# Patient Record
(demographics unavailable — no encounter records)

---

## 2019-12-14 NOTE — HP
This is Bird Boyce PA-C dictating a report for Finn Damico MD.

This is a 50-minute initial patient evaluation of which greater than 50% of the exam

was spent in counseling and coordinating the patient's care.  Remainder of the exam

was spent in review of patient's medical records and formulation of treatment plan,

as well as review of appropriate imaging studies. 



CHIEF COMPLAINT:  Status post fall with left hand paresthesias and generalized arm

weakness. 



HISTORY OF PRESENT ILLNESS:  Mr. Acevedo is a pleasant 82-year-old male, who presents

as a transfer from [facility] for the above complaints.

Apparently, the patient sustained a fall in December 12th of which his legs gave

way.  He did strike the top of his head.  He has a history of hypertension, coronary

artery disease, and CABG roughly 15 years ago.  He is on 81 mg aspirin daily and has

received 40 mg subcu of Lovenox for DVT prophylaxis.  The patient had complaints of

posterior neck pain, although during the exam today denies this.  He has also had

entire arm worse on the right than the left paresthesias.  The patient however has

noticed significant balance difficulties and difficulty with ambulation.  He

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  MRI of the cervical spine shows

significant spondylosis virtually through C4-C7 with T2 hyperintensity roughly at

C4-C7.  The patient has been on some Rocephin for bronchitis and he did receive 10

mg of Decadron prior to his transfer from [facility].  I should

also note that he has most profound stenosis on the left at C5. 



PHYSICAL EXAMINATION:

The patient is awake, alert, and appropriate.  GCS is 15.  He follows commands

equally in all extremities.  He appears to have some right greater than left

weakness in the right arm and leg.  He has profound bilateral triceps weakness and

moderate-to-severe bilateral hand intrinsic weakness.  In fact, he is unable to even

spread his fingers apart signifying significant hand intrinsic weakness.  He has

intact sensation throughout all the extremities.  He apparently has been able to sit

at the edge of the bed, but has been unable to stand secondary to unsteadiness and

weakness on his legs.  He is wearing an Aspen collar, though prefers to keep this

rather loose as he states that it is uncomfortable. 



IMPRESSION DIAGNOSES:  

1. Status post fall with cervical spondylosis with significant spinal cord

compression. 

2. Right greater than left extremity weakness.

3. History of coronary artery bypass grafting on 81 mg aspirin.



PLAN:  At this time, I discussed the patient's case and imaging with Dr. Damico.  We

have transferred the patient from [facility] secondary to the

patient requiring higher level of care, neuromonitoring in the ICU, and plan for

surgical intervention tomorrow.  I have updated the patient's family at bedside, as

well as the patient.  Plan will be to proceed to the operating room tomorrow for

C4-C7 anterior cervical diskectomy and fusion and during the same procedure C4-C7

laminectomies with posterior fusion.  I have discussed risks and benefits of the

surgery, and the patient and his family wished to proceed with the surgery.  At this

time, however, we will ask that he remain on bedrest with a Perez in place and we

will continue Decadron 4 mg q.6 hours with Protonix.  He will be n.p.o. at midnight.

 The patient's family understand the gravity of the situation and that if we do not

further preserve the patient's spinal cord, he will be unable to move his arms and

legs, especially if he has another fall.  Again, they wished to proceed with

surgery.  Please call with any changes in patient's neurologic status.  Otherwise,

we will check back on the patient in the morning. 







Job ID:  [id number]

## 2019-12-15 NOTE — CON
DATE OF CONSULTATION:  12/15/2019



REQUESTING PHYSICIAN:  Bird Boyce PA-C



REASON FOR CONSULTATION:  Medical management.



HISTORY OF PRESENT ILLNESS:  This is an 82-year-old  male with 
past

medical history of coronary artery disease, status post remote CABG, on aspirin;

hypertension; dyslipidemia; who experienced a mechanical fall on Thursday with

resultant scalp laceration requiring stapling at [facility] and was

subsequently transferred to [facility] where an MRI cervical

spine revealed significant spondylosis from C4 through C7 with T2 
hyperintensity at

C4 through C7 and profound cervical spine stenosis with right greater than

left-sided weakness, who was subsequently transferred to [facility] for

neurosurgical intervention.  He is status post C4 through C7 anterior cervical

diskectomy with fusion and C4 through C7 laminectomies with posterior fusion on

12/14/2019 by surgeon Dr. Damico.  The patient was seen at bedside 
postoperatively

in ICU where he is currently extubated on room air with notable SARAH drain with

serosanguineous output.  He is accompanied by granddaughter.  He rates his 
current

pain as 0/10 in severity.  He continues to complain of weakness and swelling in 
the

right upper extremity.  He offers no other acute complaints.  Nursing staff 
notes no

acute events except for intermittent hypertension.  The patient denies any 
general

complaints, respiratory complaints, and has been tolerant of oral intake 
without any

difficulty. 



PAST MEDICAL HISTORY:  Coronary artery disease, status post remote CABG;

hypertension; dyslipidemia. 



PAST SURGICAL HISTORY:  Remote CABG.



SOCIAL HISTORY:  The patient lives at home.  He is ambulatory without assistive

devices.  He denies any current tobacco, alcohol use, but admits to remote 
tobacco

use over 2 decades ago. 



ALLERGIES:  NONE REPORTED.



REVIEW OF SYSTEMS:  Pertinent positives as noted per HPI.



MEDICATIONS:  Home medication will be reviewed as per admission medication

reconciliation. 



Current inpatient medications reviewed.



FAMILY HISTORY:  The patient denies any chronic medical comorbidities in family

members. 



PHYSICAL EXAMINATION:

VITAL SIGNS:  Pulse 75 to 79, sinus rhythm; blood pressure ranges from 162/75 to

170s over 70s to 80s; oxygen saturation 96% on room air; respirations 18. 

GENERAL APPEARANCE:  This is an elderly male, who is awake, alert, oriented,

coherent, lucid, not in obvious distress. 

HEENT:  Normocephalic.  There are staples noted overlying anterior skull.  No 
facial

asymmetry.  Pupils equally round.  Extraocular muscles intact. 

NECK:  There is a bandage overlying anterior neck with a SARAH drain with

serosanguineous output.  There is posterior neck bandage also noted with limited

range of motion. 

CARDIOVASCULAR:  S1 and S2.  Regular rate and rhythm.  No harsh murmur.  No

reproducible left anterior chest wall tenderness to palpation.  Median 
sternotomy

scar noted. 

LUNGS:  Bilateral equal air entry on anterior auscultation.  No labored

respirations.  No wheezing or rales. 

ABDOMEN:  Soft, nontender, nondistended. 

EXTREMITIES:  No appreciable lower extremity edema.  There is edema in the right

upper extremity noted.  No cyanosis or deformity. 

SKIN:  Warm to touch without rash, pallor, or abrasion. 

GENITOURINARY:  Evaluation and doing Perez catheter with yellow urine. 

NEUROLOGIC:  No facial asymmetry.  Symmetrical smile, symmetrical muscle with 
facial

expression.  Reported sensation symmetrical in bilateral upper extremities.  
There

is decreased handgrip on the right hand in comparison to the left hand.  The 
patient

is able to lift bilateral lower extremities to antigravity. 



LABORATORY DATA:  On 12/15/2019, reviewed.  WBC 6.8, H and H of 11.2/33.3, 
platelets

120.  Sodium 141, potassium 4, chloride 109, bicarb 24, glucose 133, BUN and

creatinine are 15/0.97, calcium 9.0. 



IMAGING:  From outside facility, MRI cervical spine reported to suggest severe 
cord

compression from C5 through C7 with moderate-to-severe at C4-C5, namely in the 
left

C5 neuroforamen with extensive edema in the spinal cord from C3 all the way 
down to

T1 with kyphotic deformity. 



ASSESSMENT:  

1. Hypertension, uncontrolled.  May be attributed to multiple factors in the

perioperative setting.  The patient has been restarted on his home dose of oral

lisinopril 20 mg daily.  Noted blood pressure parameters with p.r.n. IV

antihypertensive medications.  Monitor for optimal pain control, anxiety,

positioning. 

2. Dyslipidemia.  Continue Lipitor 20 mg nightly.

3. Coronary artery disease, status post remote coronary artery bypass graft.  
Hold

antiplatelet therapy until okay with neurosurgeon.  Continue remainder of 
coronary

artery disease medications with statin and ACE inhibitor. 

4. Acute cervical spinal cord compression.  The patient is status post anterior

cervical diskectomy with fusion as well as posterior cervical laminectomies by

neurosurgeon, Dr. Damico.  Continue postoperative care as per surgeon.  Monitor 
SARAH

drain output.  The patient is notably on IV Decadron, GI prophylaxis, and

perioperative antibiotics.  Monitor for pain control.  Continue rehabilitation 
as

per surgeon. 

5. Gastrointestinal prophylaxis with IV Protonix.

6. Deep venous thrombosis prophylaxis in bilateral lower extremity with SCDs. 



Thank you for the courtesy of this medical consultation.  We will follow this

patient along with you.  Plan of care discussed with the patient, the patient's

granddaughter, and ICU nurse. 







Job ID:  [id number]



MTDD

## 2019-12-15 NOTE — PRG
DATE OF SERVICE:  12/15/2019



I reviewed the notes of my colleague, Bird Boyce PA-C, and agree with its

content.  Mr. Acevedo is an 82-year-old very active gentleman.  He works as a

.  He fell three days ago and sustained a facial injury that required

staples to the forehead.  He then began to develop progressive quadriparesis, and

presented to [facility] yesterday afternoon.  MRI demonstrated

severe cord compression from C5 through C7 with a moderate-to-severe at C4-C5,

namely in the left C5 neuroforamen.  There was extensive edema in the spinal cord

from C3 all the way down to T1 and there was a kyphotic deformity that the patient

had.  This was circumferential stenosis with spondylitic change.  He was initiated

on Decadron and transferred to [facility] for higher level of care and

close neuro ICU monitoring.  Overnight as his MAPs ranged from 80 to 111, we

initiated Decadron.  He has noticed improvement.  His exam is as laid out by my

colleague, Ms. Boyce. 



I have let the patient and his family know that this needs to be surgically dealt

with.  We will plan for an anterior C4 through C7 decompression and fusion and also

posterior C4 through C7 laminectomy and stabilization and to reverse his deformity

and decompress the spinal cord.  I should note his labs are acceptable.  He does

have a recent history of bronchitis and has been treated with antibiotics.  He did

have a temperature of 100.5 classified as a fever.  However, this is an emergent

case that we cannot, but I do not feel comfortable delaying.  I should note that the

edema and the swelling in his cord indicates an acute injury.  I do not think an

underlying intramedullary neoplasm is present and a contrast-enhanced imaging would

be dubious as often times acute inflammation and even compression can enhance as

well.  Obviously, one option would be a followup MRI at three months, but this for

all intents and purposes is an acute compressive cord injury, not neoplastic in my

opinion.  Should note his CT demonstrates expected multilevel severe spondylitic

changes with deformity.  We have extensively discussed the surgery with he and his

family.  He is in a collar.  They understand and we will proceed with cervical

spinal cord injury with compression and quadriparesis. 







Job ID:  [id number]

## 2019-12-16 NOTE — PDOC.PALCO
Palliative Care Consult





- Consult Details


Requesting Physician: Dr Landry


Reason for Consult: advance directives assistance, family support


Family Members Present: Paitent wife and daughter 





- Pertinent HPI





82 year old male with increase in weakness, unsteady gait and falls as per 

daughter who is at bedside. Review of records, and history obtained in room 

indicated patient had a  fall Dec 12 and presented to the emergency room . 

Patient was transferred to [facility] for a higher level of care. Patient 

admitted for S/P cervical spondylosis with spinal cord compression, and 

bronchitis. 





- Pertinent PMH





HTN, CAD with CABG 15 years ago. 





- Social History


Smoking Status: Unknown if ever smoked


Alcohol Use: none


Drug Use History: none


Living Situation:  (Lives independently with his wife.)





- Medications


MAR Reviewed: Yes





- Allergies


Allergies/Adverse Reactions: 


 Allergies











Allergy/AdvReac Type Severity Reaction Status Date / Time


 


No Known Allergies Allergy   Verified 03/26/17 06:05














- Subjective





C-collar on, awake alert. 





- ROS


Constitutional: alert, weakness


Eyes: other (Denies visual changes)


ENT: other (negative for dysphagia, hearing loss)


Respiratory: productive cough, shortness of breath with extertion


Cardiology: other (negative for chest pain, palpitations. )


Gastrointestinal: other (negative for nausea, vomiting, constipation)


Musculoskeletal: limited mobility


Neurological: dizziness





- Objective


Vital Signs: 


 Vital Signs - Most Recent











Temp Pulse Resp BP Pulse Ox


 


 98.5 F   77   16   124/83   95 


 


 12/16/19 12:00  12/16/19 09:59  12/15/19 19:08  12/16/19 09:59  12/16/19 07:18











Palliative Performance Scale: 40





- Advance Directives


Medical Power of : Daughter states it is her, will follow up with 

paperwork





- Physical Exam


Constitutional: NAD


HEENT: EOMI, moist MMs, sclera anicteric


Respiratory: cough, wheezing present


Cardiovascular: RRR


Gastrointestinal: non-tender, no distention


Genitourinary: moss catheter


Musculoskeletal: edema present


Neurology: moves all 4 limbs


Skin: cap refill <2 seconds


Deviation from normal: facial brusing from fall, abrasion to scalp


Psychiatric: A&O x 3, normal affect





- Problem List


(1) Palliative care encounter


Code(s): Z51.5 - ENCOUNTER FOR PALLIATIVE CARE   Current Visit: Yes   Status: 

Acute   





(2) Cervical spinal cord compression


Code(s): G95.20 - UNSPECIFIED CORD COMPRESSION   Current Visit: Yes   Status: 

Acute   





(3) CHI (closed head injury)


Code(s): S09.90XA - UNSPECIFIED INJURY OF HEAD, INITIAL ENCOUNTER   Current 

Visit: No   Status: Acute   





- Plan/Recommendations


Plan:


Patient and family desire for patient to remain with full resuscitative 

measures in place. 


*Daughter states she is MPOA (Secondary to her mother/patients wife dementia) 

will follow up and have [facility] ensure paperwork is in order.


*Family plans on rehab at discharge to gain strength to return to the home 

setting. 


*Discussed with daughter to also consider goals of care for the future if falls/

weakness for her father continue and her mothers dementia progresses. 


*Denies any further needs from Palliative Care





Palliative Care will ensure MPOA complete and then sign off. If further 

assistance is needed please make us aware.




















[45] minutes spent on this encounter with >50% of the time in counseling and 

coordination of care.





Thank you for this very appropriate consult.

## 2019-12-16 NOTE — CON
DATE OF CONSULTATION:  



HISTORY OF PRESENT ILLNESS:  Chaz Acevedo is an 82-year-old gentleman.  His history

is outlined.  He was transferred from [facility] to higher level of care

over here, he underwent a cervical laminectomy by Dr. Damico.  Please review his

extensive note. His postop in the ICU, off the vent.  Procedures performed; anterior

C4-C7 decompression and fusion, C4-C7 laminectomy and stabilization.  He tells me

that he has no primary care doctor, but does see a local cardiologist. 



He was hospitalized at [facility] with frequent falls.  He has had a

previous subdural in the past. 



PAST MEDICAL HISTORY:  

1. Coronary artery disease.

2. Hypertension.

3. Dyslipidemia.



PAST SURGICAL HISTORY:  Bypass.



SOCIAL HISTORY:  Tobacco 15 years ago, quit, a pack every other day.  __________

pneumonia, TB, or asthma. 



HOME MEDICINES:  

1. Lisinopril 20.

2. Advair 100/50, presumed asthma.

3. Eye drops.



REVIEW OF SYSTEMS:  Otherwise, unremarkable postop.



PHYSICAL EXAMINATION:

VITAL SIGNS:  Pulse is 68, blood pressure is 117/80, respiratory rate is 18, sats

are 100%. 

GENERAL:  Awake, alert, responsive. 

EXTREMITIES:  Moves all 4 extremities. 

CHEST: No wheezing or crackles. 

CARDIAC: Normal S1, S2.  No gallops. 

ABDOMEN:  No masses.



LABORATORY DATA:  Unremarkable. 



Urine shows E coli from 12/12, sensitive to all the antibiotics.



ASSESSMENT:  

1. Presumed urinary tract infection.

2. History of asthma.

3. Cervical laminectomy.

4. Hypertension.

5. Coronary artery disease.



PLAN:  We will follow in the ICU.  I will restart his home medication. 



Consultation note, 70 minutes, 50% direct patient care.







Job ID:  [id number]

## 2019-12-16 NOTE — PRG
DATE OF SERVICE:  12/16/2019



This is Bird Boyce PA-C dictating a report for Finn Damico MD.



Postoperative recheck.  Mr. Acevedo is postoperative day #1 having undergone anterior

and posterior cervical fusions for spinal cord injury and spinal cord compression.

The patient is doing very well today.  He has had improvement in his right upper

extremity strength and that especially into the triceps, but not yet into the 

strength.  He is up sitting in a chair.  He has an Aspen collar on.  His drain

output overnight was 45 mL and we will leave this in today.  We will continue Ancef.

 His ultrasound was negative for bilateral lower extremity DVT.  He is safe for

transfer to the surgical floor __________ therapies.  Again, he should be in his

[place] collar at all times and I have ordered a [place] collar for showers.  We

will begin an 8-day Decadron taper starting tomorrow.  We will hold his aspirin for

5 days postoperatively.  Please call with any changes in the patient's neurologic

status.  Otherwise, we will continue to follow him. 







Job ID:  [id number]

## 2019-12-16 NOTE — PRG
DATE OF SERVICE:  12/16/2019



SUBJECTIVE:  Chaz Acevedo is status post spinal cord surgery, laminectomy.  Postop,

doing well, less pain, and less shortness of breath. 



OBJECTIVE:  VITAL SIGNS:  Saturations are 98% on room air, temperature 98, blood

pressure 127/64, and respiratory rate 18. 

CHEST:  Minimal wheezing. 

CARDIAC:  Normal S1 and S2.  No gallops. 

ABDOMEN:  Soft.



ASSESSMENT:  Chronic obstructive pulmonary disease, asthma, stable; status post

decompression surgery. 



PLAN:  Continue present treatment now, Dulera neb treatment, supportive care.  We

will follow in the ICU. 







Job ID:  [id number]

## 2019-12-16 NOTE — ULT
BILATERAL LOWER EXTREMITY VENOUS DOPPLER WITH SPECTRAL ANALYSIS AND COLOR FLOW EVALUATION:

 

HISTORY: 

Immobilization.

 

FINDINGS: 

Gray scale, color flow, Doppler evaluation, and spectral analysis and bilateral lower extremity venou
s structures is performed with 2D imaging.  A bilateral lower extremity common femoral, superficial f
emoral, popliteal, posterior tibial, and most proximal greater saphenous veins and profunda femoral v
eins are imaged bilaterally.

 

There is normal lumen compressibility, flow, and augmentation in the visualized deep venous structure
s of bilateral lower extremities.

 

IMPRESSION: 

No evidence of a deep vein thrombosis involving the visualized deep venous structures bilateral lower
 extremities.

 

POS: PASCUAL

## 2019-12-16 NOTE — PDOC.HOSPP
- Subjective


Encounter Date: 12/16/19


Encounter Time: 15:30


Subjective: 


Patient feeling better. Eating well. No pain currently. No other complaints. 

Eager to get to rehab as soon as cleared by neurosurgery.





- Objective


Vital Signs & Weight: 


 Vital Signs (12 hours)











  Temp Pulse BP Pulse Ox


 


 12/16/19 08:36    116/63 


 


 12/16/19 07:18     95


 


 12/16/19 07:00  98.7 F   


 


 12/16/19 04:00  98.5 F   


 


 12/16/19 00:00  99.8 F H   


 


 12/15/19 23:24    177/77 H 


 


 12/15/19 22:44   68  182/81 H 








 Weight











Weight                         193 lb 12.581 oz











 Most Recent Monitor Data











Heart Rate from ECG            78


 


NIBP                           130/70


 


NIBP BP-Mean                   90


 


Respiration from ECG           16


 


SpO2                           95














I&O: 


 











 12/15/19 12/16/19 12/17/19





 06:59 06:59 06:59


 


Intake Total 639 2113 240


 


Output Total 1800 1880 190


 


Balance -1161 233 50











Result Diagrams: 


 12/16/19 06:00





 12/16/19 06:00





Hospitalist ROS





- Review of Systems


Constitutional: denies: fever, chills


Respiratory: denies: cough, shortness of breath


Cardiovascular: denies: chest pain


Gastrointestinal: denies: nausea, vomiting, abdominal pain





- Medication


Medications: 


Active Medications











Generic Name Dose Route Start Last Admin





  Trade Name Freq  PRN Reason Stop Dose Admin


 


Atorvastatin Calcium  20 mg  12/15/19 21:00  12/15/19 22:12





  Lipitor  PO   20 mg





  HS ZUHAIR   Administration





     





     





     





     


 


Calcium/Vitamin D  250 mg  12/16/19 09:00  12/16/19 08:46





  Oscal + Vit D  PO   250 mg





  DAILY ZUHAIR   Administration





     





     





     





     


 


Clonidine  0.1 mg  12/15/19 13:59  12/15/19 23:24





  Catapres  PO   0.1 mg





  Q4H PRN   Administration





  Hypertension   





     





     





     


 


Hydralazine HCl  10 mg  12/15/19 14:16  12/15/19 22:44





  Apresoline  SLOW IVP   10 mg





  Q15M PRN   Administration





  KEEP SBP < 170   





     





     





     


 


Dexamethasone 4 mg/ Sodium  50.4 mls @ 100 mls/hr  12/14/19 23:59  12/16/19 05:

55





  Chloride  IVPB   50.4 mls





  Q6HR ZUHAIR   Administration





     





     





     





     


 


Sodium Chloride  1,000 mls @ 75 mls/hr  12/14/19 23:00  12/16/19 05:51





  Normal Saline 0.9%  IV   1,000 mls





  .[id number] ZUHAIR   Administration





     





     





     





     


 


Cefazolin Sodium/Dextrose 2 gm  50 mls @ 100 mls/hr  12/15/19 14:00  12/16/19 05

:52





  / Device  IVPB   50 mls





  Q8HR ZUHAIR   Administration





     





     





     





     


 


Latanoprost  1 drop  12/15/19 21:00  12/15/19 22:12





  Xalatan 0.005% Ophth Soln  R EYE   1 drop





  QPM ZUHAIR   Administration





     





     





     





     


 


Lisinopril  20 mg  12/15/19 09:00  12/16/19 08:36





  Zestril  PO   20 mg





  DAILY ZUHAIR   Administration





     





     





     





     


 


Mometasone Furoate/Formoterol Fumar  2 puff  12/15/19 18:30  12/16/19 06:51





  Dulera 200 Mcg/5 Mcg Inhaler  INH   2 puff





  BID-RT ZUHAIR   Administration





     





     





     





     


 


Multivitamins  1 tab  12/16/19 09:00  12/16/19 08:36





  Theragran  PO   1 tab





  DAILY ZUHAIR   Administration





     





     





     





     


 


Pantoprazole Sodium  40 mg  12/16/19 09:00  12/16/19 08:36





  Protonix  PO   40 mg





  DAILY ZUHAIR   Administration





     





     





     





     














- Exam


General Appearance: NAD


ENT: normocephalic atraumatic, moist mucosa


Neck - other findings: Collar in place


Heart: RRR, no murmur


Respiratory: CTAB, no wheezes


Gastrointestinal: soft, non-tender, non-distended, normal bowel sounds


Extremities: no edema


Psychiatric: normal affect, normal behavior, A&O x 3





Hosp A/P


(1) HTN (hypertension)


Code(s): I10 - ESSENTIAL (PRIMARY) HYPERTENSION   Status: Chronic   


Qualifiers: 


   Hypertension type: essential hypertension   Qualified Code(s): I10 - 

Essential (primary) hypertension   


Plan: 


improved control








(2) CAD (coronary artery disease)


Code(s): I25.10 - ATHSCL HEART DISEASE OF NATIVE CORONARY ARTERY W/O ANG PCTRS 

  Status: Chronic   


Plan: 


hx CABG








(3) Dyslipidemia


Code(s): E78.5 - HYPERLIPIDEMIA, UNSPECIFIED   Status: Chronic   


Plan: 


on Lipitor








(4) Cervical spinal cord compression


Code(s): G95.20 - UNSPECIFIED CORD COMPRESSION   Status: Acute   


Plan: 


s/p laminectomy and diskectomy with fusion








- Plan


PT/OT





to rehab when ok with neurosurgery

## 2019-12-16 NOTE — OP
DATE OF PROCEDURE:  12/15/2019



ASSISTANT:  Bird Boyce PA-C 



Modifier 57 should be added to this surgery as the decision to operate was made on

the day I saw the patient. 



PREPROCEDURE DIAGNOSES:  

1. Multilevel cervical stenosis with spinal cord compression.

2. 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



POSTPROCEDURE DIAGNOSES:  

1. Multilevel cervical stenosis with spinal cord compression.

2. 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



PROCEDURE PERFORMED:  

1. Anterior C4-C5, C5-C6, and C6-C7 diskectomies for decompression of spinal cord

and nerve roots. 

2. Placement of interbody spacer, C4-C5, C5-C6, and C6-C7, packed with local bone

autograft, obtained from same incision of allograft for arthrodesis. 

3. Anterior cervical plate and screw fixation, C4, C5, C6, and C7.

4. Use of operative microscope for microdissection.

5. C4, C5, C6, C7 laminectomies, partial facetectomies, and foraminotomies.

6. Posterior screw and corina fixation, C4, C5, C6, C7.

7. Posterolateral fusion, C4-C5, C5-C6, and C6-C7 with arthrodesis.



DESCRIPTION OF PROCEDURE:  After informed consent was obtained from the patient, the

patient was brought to the OR.  Proper patient, pause, and identification were

carried out.  He was placed under excellent endotracheal anesthesia and positioned

supine on the OR table.  All appropriate points were padded.  Cervical spine was

identified.  A right anterior oblique vic was drawn off.  This region was sterilely

cleansed, prepared, and draped.  Proper patient, pause, and identification was

carried out.  The wound was then opened with a combination of sharp, monopolar, and

blunt dissection, proceeded medial to the right carotid sheath and lateral to the

larynx, pharynx, and tracheoesophageal bundle.  I identified the prevertebral layer

of deep cervical fascia.  There was evidence of injury to the anterior longitudinal

ligament and prevertebral space from his head injury and hyperextension injury.

Localization film confirmed our area of interest following dissection and exposure

of the C4, C5, C6, C7 segments and disk spaces.  Distraction at C4-C5 occurred with

diskectomy at C4-C5 in preparation of the endplates.  We had excellent decompression

of common dural tube and nerve roots.  This was repeated following placement of

spacer at C4-C5, C5-C6, and C6-C7 with diskectomies at those segments as well,

preparation of the endplates and decompression of the neural elements.  Microscope

was used for microdissection.  Microscope was then removed and anterior cervical

plate and screw fixation from C4 through C7 then occurred.  Copious irrigation

occurred throughout as did maximizing hemostasis.  The wound was then closed in

anatomic layers over a drain.  The patient was then placed in Radha head mcneill and

positioned prone.  We then identified an approach that would allow us to reach the

C4, C5, C6, C7 dorsal spines and segments.  We then opened this area with

subperiosteal with sharp, monopolar, and blunt dissection and exposed the C4 and C7

segments.  We then performed following localization at C4, C5, C6, C7 laminectomies,

partial facetectomies, and foraminotomies, had excellent decompression in common

dural tube and nerve roots  __________ at that point, screws were placed in C4, C5,

C6, C7 in the lateral masses.  Rods were placed and final tightening.  Copious

irrigation occurred throughout as did maximizing hemostasis.  The deep posterior

lateral regions were then decorticated.  Local bone autograft obtained from same

incision.  Allograft was then placed in the posterolateral regions from C4 through

C7.  The wound was copiously irrigated.  Hemostasis was maximized.  The wound was

closed in anatomic layers.  The patient was then emerged from anesthesia. 







Job ID:  [id number]

## 2019-12-17 NOTE — PDOC.HOSPP
- Subjective


Encounter Date: 12/17/19


Encounter Time: 11:45


Subjective: 


Patient with some pain in neck with movement, no other complaints. Still not 

much right hand  strength. States BP always runs 110-130 at home.





- Objective


Vital Signs & Weight: 


 Vital Signs (12 hours)











  Temp Pulse Resp BP Pulse Ox


 


 12/17/19 08:17  98.1 F  79  16  166/83 H  92 L


 


 12/17/19 04:00  98.1 F  82  16  153/69 H  95


 


 12/17/19 00:00  98.3 F  93  16  157/73 H  97








 Weight











Weight                         193 lb 12.581 oz











 Most Recent Monitor Data











Heart Rate from ECG            81


 


NIBP                           159/81


 


NIBP BP-Mean                   107


 


Respiration from ECG           17


 


SpO2                           95














I&O: 


 











 12/16/19 12/17/19 12/18/19





 06:59 06:59 06:59


 


Intake Total 2113 600 


 


Output Total 1880 390 


 


Balance 233 210 











Result Diagrams: 


 12/16/19 06:00





 12/16/19 06:00





Hospitalist ROS





- Review of Systems


Respiratory: denies: cough, dry, shortness of breath


Cardiovascular: denies: chest pain, palpitations


Gastrointestinal: denies: nausea, vomiting, abdominal pain


Musculoskeletal: reports: neck pain


Neurological: reports: weakness





- Medication


Medications: 


Active Medications











Generic Name Dose Route Start Last Admin





  Trade Name Freq  PRN Reason Stop Dose Admin


 


Atorvastatin Calcium  20 mg  12/15/19 21:00  12/16/19 21:54





  Lipitor  PO   20 mg





  HS ZUHAIR   Administration





     





     





     





     


 


Calcium/Vitamin D  250 mg  12/16/19 09:00  12/16/19 08:46





  Oscal + Vit D  PO   250 mg





  DAILY ZUHAIR   Administration





     





     





     





     


 


Clonidine  0.1 mg  12/15/19 13:59  12/15/19 23:24





  Catapres  PO   0.1 mg





  Q4H PRN   Administration





  Hypertension   





     





     





     


 


Dexamethasone  4 mg  12/17/19 06:00  12/17/19 05:22





  Decadron  PO  12/19/19 00:00  4 mg





  Q6HR ZUHAIR   Administration





     





     





     





     


 


Hydralazine HCl  10 mg  12/15/19 14:16  12/15/19 22:44





  Apresoline  SLOW IVP   10 mg





  Q15M PRN   Administration





  KEEP SBP < 170   





     





     





     


 


Dexamethasone 4 mg/ Sodium  50.4 mls @ 100 mls/hr  12/14/19 23:59  12/17/19 05:

21





  Chloride  IVPB   Not Given





  Q6HR ZUHAIR   





     





     





     





     


 


Sodium Chloride  1,000 mls @ 75 mls/hr  12/14/19 23:00  12/17/19 05:29





  Normal Saline 0.9%  IV   Not Given





  .[id number] ZUHAIR   





     





     





     





     


 


Cefazolin Sodium/Dextrose 2 gm  50 mls @ 100 mls/hr  12/15/19 14:00  12/17/19 05

:22





  / Device  IVPB   50 mls





  Q8HR ZUHAIR   Administration





     





     





     





     


 


Latanoprost  1 drop  12/15/19 21:00  12/16/19 21:54





  Xalatan 0.005% Ophth Soln  R EYE   1 drop





  QPM ZUHAIR   Administration





     





     





     





     


 


Lisinopril  20 mg  12/15/19 09:00  12/16/19 08:36





  Zestril  PO   20 mg





  DAILY ZUHAIR   Administration





     





     





     





     


 


Mometasone Furoate/Formoterol Fumar  2 puff  12/15/19 18:30  12/17/19 07:35





  Dulera 200 Mcg/5 Mcg Inhaler  INH   2 puff





  BID-RT ZUHAIR   Administration





     





     





     





     


 


Multivitamins  1 tab  12/16/19 09:00  12/16/19 08:36





  Theragran  PO   1 tab





  DAILY ZUHAIR   Administration





     





     





     





     


 


Pantoprazole Sodium  40 mg  12/16/19 09:00  12/16/19 08:36





  Protonix  PO   40 mg





  DAILY ZUHAIR   Administration





     





     





     





     














- Exam


General Appearance: NAD


Eye: anicteric sclera


Heart: RRR, no murmur, no gallops, no rubs


Respiratory: CTAB, no wheezes, no rales


Gastrointestinal: soft, non-tender, non-distended, normal bowel sounds, no 

palpable masses


Neurological - other findings: weak right hand 


Psychiatric: normal affect, normal behavior





Hosp A/P


(1) HTN (hypertension)


Code(s): I10 - ESSENTIAL (PRIMARY) HYPERTENSION   Status: Chronic   


Qualifiers: 


   Hypertension type: essential hypertension   Qualified Code(s): I10 - 

Essential (primary) hypertension   


Plan: 


persistently elevated, will add amlodipine to lisinopril and monitor








(2) CAD (coronary artery disease)


Code(s): I25.10 - ATHSCL HEART DISEASE OF NATIVE CORONARY ARTERY W/O ANG PCTRS 

  Status: Chronic   





(3) Dyslipidemia


Code(s): E78.5 - HYPERLIPIDEMIA, UNSPECIFIED   Status: Chronic   





(4) Cervical spinal cord compression


Code(s): G95.20 - UNSPECIFIED CORD COMPRESSION   Status: Acute   





- Plan


PT/OT


to rehab when ok with neurosurgery

## 2019-12-17 NOTE — PRG
DATE OF SERVICE:  12/17/2019



Mr. Acevedo is postoperative day #2 from anterior-posterior decompression and fusion

of the spinal cord.  He appears to be in an excellent mood, doing very well.  He

states no pain medication.  He is a bit restless.  His exam has certainly improved.

He does have bilateral C7 myotomal weakness, not surprising also with hand intrinsic

weakness associated with finger extensor weakness, but overall frankly I am very

pleased with how he is doing.  We are working on inpatient rehab and we have kept

his drain in place at this point.  He is tolerating soft diet.  His drain output has

been 15 mL over the last 24 hours.  We will likely remove this tomorrow. 







Job ID:  [id number]

## 2019-12-17 NOTE — PRG
DATE OF SERVICE:  12/17/2019



This is Bird Boyce PA-C dictating a report for Finn Damico MD.



Mr. Acevedo is postoperative day #2 having undergone anterior and posterior cervical

fusions.  The patient is doing remarkably well.  He denies pain into the neck or

into any of the extremities.  He has found that he has also had improved strength on

the right including the right triceps and right hand , although he does continue

to have moderate-to-severe weakness into the right triceps and profound weakness

into the right hand .  Again, this is improved compared to his preoperative

exam.  We have started him on an 8-day Decadron taper and Protonix.  He may be

without his collar when he is in bed, but anytime he is out of bed, here he needs to

be wearing this.  We would really like to mobilize him more to prevent any type of

atelectasis, postoperative fever, or bilateral lower extremity DVT.  I should also

note that he did have an ultrasound of the bilateral lower extremities yesterday and

this was negative for DVT.  We will continue to leave the patient's SARAH drain in,

though output has been about 15 over the past 12 hours.  He also remains on Ancef

for this.  Main thing now is inpatient rehab and recovery, and we will ask the

Therapies work toward this angle.  Please call with any changes in the patient's

neurologic status.  Otherwise, the patient does appear to be improving

neurologically. 







Job ID:  [id number]

## 2019-12-18 NOTE — PDOC.HOSPP
- Subjective


Encounter Date: 12/18/19


Encounter Time: 11:30


Subjective: 


Patient with drain out this AM. No complaints.





- Objective


Vital Signs & Weight: 


 Vital Signs (12 hours)











  Temp Pulse Resp BP BP BP Pulse Ox


 


 12/18/19 09:28   64   130/60   


 


 12/18/19 08:20        95


 


 12/18/19 07:40  98.1 F  64  18   130/60   95


 


 12/18/19 04:00  98.9 F  71  16    144/62 H  93 L


 


 12/18/19 00:00  98.5 F  69  16    138/65  96








 Weight











Weight                         193 lb 12.581 oz











 Most Recent Monitor Data











Heart Rate from ECG            81


 


NIBP                           159/81


 


NIBP BP-Mean                   107


 


Respiration from ECG           17


 


SpO2                           95














I&O: 


 











 12/17/19 12/18/19 12/19/19





 06:59 06:59 06:59


 


Intake Total 600 3057 240


 


Output Total 390 2905 625


 


Balance 210 152 -385











Result Diagrams: 


 12/16/19 06:00





 12/16/19 06:00





Hospitalist ROS





- Review of Systems


Respiratory: denies: cough, dry, shortness of breath


Cardiovascular: denies: chest pain, palpitations


Gastrointestinal: denies: nausea, vomiting, abdominal pain


Neurological: reports: weakness





- Medication


Medications: 


Active Medications











Generic Name Dose Route Start Last Admin





  Trade Name Freq  PRN Reason Stop Dose Admin


 


Hydrocodone Bitart/Acetaminophen  1 tab  12/14/19 23:09  12/17/19 09:40





  Norco 7.5/325  PO   1 tab





  Q4H PRN   Administration





  Moderate Pain (4-6)   





     





     





     


 


Amlodipine Besylate  5 mg  12/17/19 09:00  12/18/19 09:28





  Norvasc  PO   5 mg





  DAILY ZUHAIR   Administration





     





     





     





     


 


Atorvastatin Calcium  20 mg  12/15/19 21:00  12/17/19 20:47





  Lipitor  PO   20 mg





  HS ZUHAIR   Administration





     





     





     





     


 


Calcium/Vitamin D  250 mg  12/16/19 09:00  12/18/19 09:28





  Oscal + Vit D  PO   250 mg





  DAILY ZUHAIR   Administration





     





     





     





     


 


Clonidine  0.1 mg  12/15/19 13:59  12/15/19 23:24





  Catapres  PO   0.1 mg





  Q4H PRN   Administration





  Hypertension   





     





     





     


 


Dexamethasone  4 mg  12/17/19 06:00  12/18/19 05:45





  Decadron  PO  12/19/19 00:00  4 mg





  Q6HR ZUHAIR   Administration





     





     





     





     


 


Hydralazine HCl  10 mg  12/15/19 14:16  12/15/19 22:44





  Apresoline  SLOW IVP   10 mg





  Q15M PRN   Administration





  KEEP SBP < 170   





     





     





     


 


Sodium Chloride  1,000 mls @ 75 mls/hr  12/14/19 23:00  12/18/19 05:46





  Normal Saline 0.9%  IV   1,000 mls





  .[id number] ZUHAIR   Administration





     





     





     





     


 


Cefazolin Sodium/Dextrose 2 gm  50 mls @ 100 mls/hr  12/15/19 14:00  12/18/19 05

:46





  / Device  IVPB   50 mls





  Q8HR ZUHAIR   Administration





     





     





     





     


 


Latanoprost  1 drop  12/15/19 21:00  12/17/19 20:48





  Xalatan 0.005% Ophth Soln  R EYE   1 drop





  QPM ZUHAIR   Administration





     





     





     





     


 


Lisinopril  20 mg  12/15/19 09:00  12/18/19 09:28





  Zestril  PO   20 mg





  DAILY ZUHAIR   Administration





     





     





     





     


 


Mometasone Furoate/Formoterol Fumar  2 puff  12/15/19 18:30  12/18/19 07:12





  Dulera 200 Mcg/5 Mcg Inhaler  INH   2 puff





  BID-RT ZUHAIR   Administration





     





     





     





     


 


Multivitamins  1 tab  12/16/19 09:00  12/18/19 09:28





  Theragran  PO   1 tab





  DAILY ZUHAIR   Administration





     





     





     





     


 


Pantoprazole Sodium  40 mg  12/16/19 09:00  12/18/19 09:28





  Protonix  PO   40 mg





  DAILY ZUHAIR   Administration





     





     





     





     














- Exam


General Appearance: NAD


Eye: anicteric sclera


ENT: moist mucosa


Heart: RRR, no murmur, no gallops, no rubs


Respiratory: CTAB, no wheezes, no rales, no ronchi


Gastrointestinal: soft, non-tender, non-distended, normal bowel sounds


Psychiatric: normal affect, normal behavior, A&O x 3





Hosp A/P


(1) HTN (hypertension)


Code(s): I10 - ESSENTIAL (PRIMARY) HYPERTENSION   Status: Chronic   


Qualifiers: 


   Hypertension type: essential hypertension   Qualified Code(s): I10 - 

Essential (primary) hypertension   





(2) CAD (coronary artery disease)


Code(s): I25.10 - ATHSCL HEART DISEASE OF NATIVE CORONARY ARTERY W/O ANG PCTRS 

  Status: Chronic   





(3) Dyslipidemia


Code(s): E78.5 - HYPERLIPIDEMIA, UNSPECIFIED   Status: Chronic   





(4) Cervical spinal cord compression


Code(s): G95.20 - UNSPECIFIED CORD COMPRESSION   Status: Acute   


Plan: 


s/p laminectomy and diskectomy with fusion








- Plan


PT/OT


to rehab when ok with neurosurgery

## 2019-12-18 NOTE — PRG
DATE OF SERVICE:  12/18/2019



This is Bird Boyce PA-C dictating a report for Finn Damico MD.



Mr. Acevedo is postoperative day #3 having undergone anterior and posterior cervical

fusions.  The patient is doing remarkably well.  He has baseline weakness to the

right upper extremity including triceps and hand , though this appears to be

somewhat stable from yesterday.  It is improved compared to his preoperative exam.

He has been using his incentive spirometry.  He is afebrile.  His drain output has

been roughly 10 mL overnight.  We will remove this.  We would like him to work with

therapies and wear his neck brace anytime he is out of bed and [place] collar

for showers.  He is stable for discharge from neurosurgical standpoint, and Case

Management is working to arrange discharge to inpatient rehab pending approval from

his insurance.  We will continue to follow the patient, but again, he is doing well

postoperatively. 







Job ID:  [id number]

## 2019-12-19 NOTE — PRG
DATE OF SERVICE:  12/19/2019



This is Bird Boyce PA-C dictating a report for Finn Damico MD.



This is a postoperative recheck.  Mr. Acevedo is postoperative day #4 having undergone

anterior and posterior cervical fusions for central cord compression.  The patient

continues to improve.  His SARAH drain was removed yesterday, as well as his Perez, and

he has been urinating.  He has been up walking with therapies.  He has been approved

for rehab by his insurance.  We are just waiting for a bed availability, and the

paperwork has been completed.  The patient is tolerating full liquid and tolerating

pills, and he has asked to have his diet advanced, but unfortunately, at this time,

I would not like to do that given that I am very afraid that he may aspirate or have

significant coughing, which would increase his neck pain.  Currently, he has no pain

complaints.  He does continue to have severe weakness in the hand  and hand

intrinsics on the right as well as mild-to-moderate, though still improved strength

into the right triceps.  He has good strength in the left upper extremity and moves

the bilateral lower extremities equally.  Please call with any changes in the

patient's neurologic status.  Otherwise, we are waiting for inpatient rehab. 







Job ID:  [id number]

## 2019-12-19 NOTE — PDOC.HOSPP
- Subjective


Encounter Date: 12/19/19


Encounter Time: 12:00


Subjective: 


Patient feeling well. No events overnight. Eager to go to rehab.





- Objective


Vital Signs & Weight: 


 Vital Signs (12 hours)











  Temp Pulse Resp BP BP Pulse Ox


 


 12/19/19 11:41  97.5 F L  54 L  16   133/65  95


 


 12/19/19 09:03     161/71 H  


 


 12/19/19 09:02   62   161/71 H  


 


 12/19/19 08:55       95


 


 12/19/19 08:31   85  16    95


 


 12/19/19 08:05  97.6 F  62  18   161/71 H  95


 


 12/19/19 03:25  97.6 F  61  16   142/77 H  95








 Weight











Weight                         193 lb 12.581 oz











 Most Recent Monitor Data











Heart Rate from ECG            81


 


NIBP                           159/81


 


NIBP BP-Mean                   107


 


Respiration from ECG           17


 


SpO2                           95














I&O: 


 











 12/18/19 12/19/19 12/20/19





 06:59 06:59 06:59


 


Intake Total 3057 890 50


 


Output Total 2905 2305 300


 


Balance 152 -1415 -250











Result Diagrams: 


 12/16/19 06:00





 12/16/19 06:00





Hospitalist ROS





- Review of Systems


Constitutional: denies: fever, chills


Respiratory: denies: cough, shortness of breath


Cardiovascular: denies: chest pain, palpitations


Gastrointestinal: denies: nausea, vomiting, abdominal pain


Neurological: reports: weakness





- Medication


Medications: 


Active Medications











Generic Name Dose Route Start Last Admin





  Trade Name Freq  PRN Reason Stop Dose Admin


 


Hydrocodone Bitart/Acetaminophen  1 tab  12/14/19 23:09  12/19/19 09:05





  Norco 7.5/325  PO   1 tab





  Q4H PRN   Administration





  Moderate Pain (4-6)   





     





     





     


 


Amlodipine Besylate  5 mg  12/17/19 09:00  12/19/19 09:02





  Norvasc  PO   5 mg





  DAILY ZUHAIR   Administration





     





     





     





     


 


Atorvastatin Calcium  20 mg  12/15/19 21:00  12/18/19 20:42





  Lipitor  PO   20 mg





  HS ZUHAIR   Administration





     





     





     





     


 


Calcium/Vitamin D  250 mg  12/16/19 09:00  12/19/19 09:02





  Oscal + Vit D  PO   250 mg





  DAILY ZUHAIR   Administration





     





     





     





     


 


Clonidine  0.1 mg  12/15/19 13:59  12/15/19 23:24





  Catapres  PO   0.1 mg





  Q4H PRN   Administration





  Hypertension   





     





     





     


 


Dexamethasone  3 mg  12/19/19 06:00  12/19/19 05:35





  Decadron  PO  12/21/19 00:00  3 mg





  Q6HR ZUHAIR   Administration





     





     





     





     


 


Hydralazine HCl  10 mg  12/15/19 14:16  12/15/19 22:44





  Apresoline  SLOW IVP   10 mg





  Q15M PRN   Administration





  KEEP SBP < 170   





     





     





     


 


Sodium Chloride  1,000 mls @ 75 mls/hr  12/14/19 23:00  12/19/19 09:03





  Normal Saline 0.9%  IV   Not Given





  .[id number] ZUHAIR   





     





     





     





     


 


Latanoprost  1 drop  12/15/19 21:00  12/18/19 20:42





  Xalatan 0.005% Ophth Soln  R EYE   1 drop





  QPM ZUHAIR   Administration





     





     





     





     


 


Lisinopril  20 mg  12/15/19 09:00  12/19/19 09:03





  Zestril  PO   20 mg





  DAILY ZUHAIR   Administration





     





     





     





     


 


Mometasone Furoate/Formoterol Fumar  2 puff  12/15/19 18:30  12/19/19 08:31





  Dulera 200 Mcg/5 Mcg Inhaler  INH   2 puff





  BID-RT ZUHAIR   Administration





     





     





     





     


 


Multivitamins  1 tab  12/16/19 09:00  12/19/19 09:02





  Theragran  PO   1 tab





  DAILY ZUHAIR   Administration





     





     





     





     


 


Pantoprazole Sodium  40 mg  12/16/19 09:00  12/19/19 09:02





  Protonix  PO   40 mg





  DAILY ZUHAIR   Administration





     





     





     





     














- Exam


General Appearance: NAD


Eye: anicteric sclera


ENT: moist mucosa


Heart: RRR, no murmur, no gallops


Respiratory: CTAB, no wheezes, no rales, no ronchi


Gastrointestinal: soft, non-tender, non-distended, normal bowel sounds


Psychiatric: normal affect, normal behavior, A&O x 3





Hosp A/P


(1) HTN (hypertension)


Code(s): I10 - ESSENTIAL (PRIMARY) HYPERTENSION   Status: Chronic   


Qualifiers: 


   Hypertension type: essential hypertension   Qualified Code(s): I10 - 

Essential (primary) hypertension   





(2) CAD (coronary artery disease)


Code(s): I25.10 - ATHSCL HEART DISEASE OF NATIVE CORONARY ARTERY W/O ANG PCTRS 

  Status: Chronic   





(3) Dyslipidemia


Code(s): E78.5 - HYPERLIPIDEMIA, UNSPECIFIED   Status: Chronic   





(4) Cervical spinal cord compression


Code(s): G95.20 - UNSPECIFIED CORD COMPRESSION   Status: Acute   





- Plan


to rehab when bed available

## 2019-12-20 NOTE — PDOC.HOSPP
- Subjective


Encounter Date: 12/20/19


Encounter Time: 13:30


Subjective: 





Patient seen and examined for med mngt. Transient lightheaded on standing. No CP

/syncope. No new complaints. No overnight events





- Objective


Vital Signs & Weight: 


 Vital Signs (12 hours)











  Temp Pulse Resp BP BP BP Pulse Ox


 


 12/20/19 11:52  98.0 F  95  18    97/64  93 L


 


 12/20/19 08:51   62   116/77   


 


 12/20/19 08:50     116/77    96


 


 12/20/19 07:55  97.8 F  61  18   116/77   96


 


 12/20/19 07:15   70  16    


 


 12/20/19 04:33  98.2 F  70  16   116/77   98








 Weight











Weight                         193 lb 12.581 oz











 Most Recent Monitor Data











Heart Rate from ECG            81


 


NIBP                           159/81


 


NIBP BP-Mean                   107


 


Respiration from ECG           17


 


SpO2                           95














I&O: 


 











 12/19/19 12/20/19 12/21/19





 06:59 06:59 06:59


 


Intake Total 890 150 


 


Output Total 2305 1425 


 


Balance -1415 -1275 











Result Diagrams: 


 12/16/19 06:00





 12/16/19 06:00





Hospitalist ROS





- Review of Systems


Respiratory: denies: cough, dry, shortness of breath, hemoptysis, SOB with 

excertion, pleuritic pain, sputum, wheezing, other


Cardiovascular: denies: chest pain, palpitations, orthopnea, paroxysmal noc. 

dyspnea, edema, light headedness, other





- Medication


Medications: 


Active Medications











Generic Name Dose Route Start Last Admin





  Trade Name Freq  PRN Reason Stop Dose Admin


 


Hydrocodone Bitart/Acetaminophen  1 tab  12/14/19 23:09  12/19/19 09:05





  Norco 7.5/325  PO   1 tab





  Q4H PRN   Administration





  Moderate Pain (4-6)   





     





     





     


 


Amlodipine Besylate  5 mg  12/17/19 09:00  12/20/19 08:51





  Norvasc  PO   5 mg





  DAILY ZUHAIR   Administration





     





     





     





     


 


Atorvastatin Calcium  20 mg  12/15/19 21:00  12/19/19 20:37





  Lipitor  PO   20 mg





  HS ZUHAIR   Administration





     





     





     





     


 


Calcium/Vitamin D  250 mg  12/16/19 09:00  12/20/19 09:20





  Oscal + Vit D  PO   250 mg





  DAILY ZUHAIR   Administration





     





     





     





     


 


Clonidine  0.1 mg  12/15/19 13:59  12/15/19 23:24





  Catapres  PO   0.1 mg





  Q4H PRN   Administration





  Hypertension   





     





     





     


 


Dexamethasone  3 mg  12/19/19 06:00  12/20/19 12:27





  Decadron  PO  12/21/19 00:00  3 mg





  Q6HR ZUHAIR   Administration





     





     





     





     


 


Hydralazine HCl  10 mg  12/15/19 14:16  12/15/19 22:44





  Apresoline  SLOW IVP   10 mg





  Q15M PRN   Administration





  KEEP SBP < 170   





     





     





     


 


Sodium Chloride  1,000 mls @ 75 mls/hr  12/14/19 23:00  12/20/19 12:29





  Normal Saline 0.9%  IV   Not Given





  [id number] ZUHAIR   





     





     





     





     


 


Latanoprost  1 drop  12/15/19 21:00  12/19/19 20:37





  Xalatan 0.005% Ophth Soln  R EYE   1 drop





  QPM ZUHAIR   Administration





     





     





     





     


 


Lisinopril  20 mg  12/15/19 09:00  12/20/19 08:50





  Zestril  PO   20 mg





  DAILY ZUHAIR   Administration





     





     





     





     


 


Mometasone Furoate/Formoterol Fumar  2 puff  12/15/19 18:30  12/20/19 07:15





  Dulera 200 Mcg/5 Mcg Inhaler  INH   2 puff





  BID-RT ZUHAIR   Administration





     





     





     





     


 


Multivitamins  1 tab  12/16/19 09:00  12/20/19 08:51





  Theragran  PO   1 tab





  DAILY ZUHAIR   Administration





     





     





     





     


 


Pantoprazole Sodium  40 mg  12/16/19 09:00  12/20/19 08:51





  Protonix  PO   40 mg





  DAILY ZUHAIR   Administration





     





     





     





     














- Exam


General Appearance: NAD


Neck: supple, no JVD


Heart: RRR, no gallops


Respiratory: no rales, no ronchi


Gastrointestinal: soft, non-tender, normal bowel sounds


Extremities: no edema





Hosp A/P





- Plan


DVT proph w/SCDs





Transient lightheadedness on standing - prob due to Orthostatic hypotension


CAD


h/o CABG


HTN


Galucoma


HLD


Chronic Anemia


Thrombocytopenia - resolved





PLAN:


Cont Lisinopril - change dose to 10 mg BID


DC Amlodipine


Check Orthostatic vitals in AM


Cont eye drops


Platelets normal on last blood draw


PT/OT


Will follow PRN


Awaiting placement

## 2019-12-21 NOTE — DIS
DATE OF ADMISSION:  12/14/2019



DATE OF DISCHARGE:  12/20/2019



This is Bird Boyce PA-C dictating a report for Finn Damico MD.



DISCHARGE DIAGNOSES:  

1. Cervical spondylosis with myelopathy.

2. Right arm and hand weakness.

3. Coronary artery disease.

4. Asthma.

5. Gastroesophageal reflux disease.

6. Hypertension.

7. Hyperlipidemia.



HOSPITAL COURSE:  Mr. Acevedo was a transfer patient from [facility] to undergo anterior and posterior cervical fusions.  The patient's surgery was

without complication.  He required several overnight stays for adequate pain control

as well as improvement in his mobility as well as some decrease in his drain output.

 At the time of discharge, he was discharged on a Decadron taper and was told to

restart his aspirin on 12/21/2019.  At the time of discharge, the patient was doing

well, had significant improvement into his bilateral hand  and right triceps

weakness.  Appropriate patient education and outpatient followups were provided to

the patient and again at the time of discharge, he was neurologically improved and

pleased with his outcome postoperatively.  He understood to call the office with

questions or concerns prior to his next followup appointment. 







Job ID:  [id number]

## 2019-12-31 NOTE — CT
CT ABDOMEN AND PELVIS WITH IV CONTRAST:

 

HISTORY:

Fever. Elevated LFTs.

 

FINDINGS:

There is patchy consolidation in the lung bases. There is intrahepatic and extrahepatic biliary ducta
l dilatation with filling defects in the distal common bile duct, consistent with choledocholithiasis
. The largest of these measures 8 mm. The pancreatic duct also appears mildly dilated. The pancreas i
s otherwise normal. No hepatic mass is seen. The spleen, adrenal glands and right kidney are unremark
able. There is an 8 mm low density lesion in the superior pole of the left kidney, likely cyst. No ca
lcified gallstones are noted.

 

No free air, free fluid or lymphadenopathy is seen in the abdomen or pelvis. There are vascular calci
fications without evidence of aneurysmal dilatation of the abdominal aorta. There are degenerative ch
anges in the spine. A small fat-containing umbilical hernia is noted. The small bowel loops are not a
bnormally dilated. There is colonic diverticulosis. The prostate is enlarged.

 

IMPRESSION:

1. Bibasilar lung consolidation, suspicious for pneumonia.

 

2. Biliary ductal dilatation due to choledocholithiasis.

 

3. Colonic diverticulosis.

 

4. Prostatic enlargement.

 

POS: OAN[facility]

## 2019-12-31 NOTE — CON
DATE OF CONSULTATION:  12/31/2019



REASON FOR CONSULTATION:  Choledocholithiasis.



HISTORY OF PRESENT ILLNESS:  Chaz Acevedo is a very pleasant 82-year-old 

American gentleman with a history of coronary artery disease status post CABG as

well as COPD.  He has no prior significant gastrointestinal history.  No history of

pancreatitis, liver disease, or gallbladder issues.  He was recently hospitalized

for a week earlier this month for cervical spine surgery.  Hospital course was

complicated by an E coli urinary tract infection, which was treated.  For the past

couple of weeks, he has been at the rehab facility.  He was sent here from the rehab

facility today due to intermittent fevers over the past day as well as elevated LFTs

on lab workup.  Note that his LFTs were all normal during his recent hospitalization

a couple of weeks ago.  Upon arrival here, he is afebrile.  Notably, he has not had

any nausea, vomiting, or abdominal pain.  He has had some constipation, which he

attributes to pain medications at the rehab facility.  He is not jaundiced; however,

labs do show elevation in transaminases to , , alkaline phosphatase

371, though normal total bilirubin at 0.9 and normal lipase.  There is no

leukocytosis with WBC at 7.5, but CT of the abdomen and pelvis demonstrates

intrahepatic and extrahepatic biliary dilation with multiple filling defects in the

distal common bile duct, consistent with stones, the largest measuring 8 mm in size.

 There is mild pancreatic ductal dilation, otherwise the gallbladder and pancreas

area look normal. 



REVIEW OF SYSTEMS:  Full review of systems including constitutional; head, eyes,

ears, nose, and throat; GI; ; cardiovascular; respiratory; musculoskeletal;

neurologic systems is negative except as noted in the HPI. 



PAST MEDICAL HISTORY:  

1. Coronary artery disease.

2. CABG.

3. Asthma.

4. Hypertension.

5. Hyperlipidemia.

6. GERD.

7. Cervical spine surgery on 12/16/2019.

8. Urinary tract infection.



ALLERGIES:  NO KNOWN DRUG ALLERGIES.



OUTPATIENT MEDICATIONS:  

1. Aspirin 81 mg daily.

2. Lipitor.

3. Calcium/vitamin-D.

4. Dexamethasone 2 mg daily.

5. Lisinopril.

6. Pantoprazole 40 mg daily.

7. Clonidine.

8. Guaifenesin.

9. Loperamide 2 mg p.r.n.

10. Simethicone.



SOCIAL HISTORY:  No smoking, alcohol, or drug use.



FAMILY HISTORY:  Noncontributory.



PHYSICAL EXAMINATION:

VITAL SIGNS:  Temperature 98.4, blood pressure 105/63, pulse 74, and 96% oxygen

saturation on room air. 

GENERAL:  An 82-year-old  man, lying in bed comfortably, in no

distress. 

MENTAL:  He is alert and oriented, a bit tangential, but able to understand our

conversation and able to answer questions about recent history and current symptoms. 

SKIN:  No jaundice.  No rashes were palpable. 

EYES:  No scleral icterus.  Extraocular movements intact. 

ENT:  Mucous membranes moist.  No oral lesions. 

LYMPH:  No submandibular or supraclavicular lymphadenopathy. 

THYROID:  Nontender to palpation. 

HEART:  Regular rate and rhythm. 

LUNGS:  Clear to auscultation bilaterally. 

ABDOMEN:  Nondistended.  Bowel sounds present.  Soft and nontender to palpation

throughout. 

EXTREMITIES:  No peripheral edema. 

VESSELS:  Radial pulses 2+ bilaterally. 

NEUROLOGIC:  Cranial nerves 2 through 12 intact bilaterally.  No focal deficits.



LABORATORY STUDIES:  WBC normal at 7.5, hemoglobin 12.3, and platelets 135.  Sodium

131, potassium 4.6, BUN 13, and creatinine 1.02.  Lipase normal at 29.  Total

bilirubin 0.9, alkaline phosphatase elevated at 371, , , and albumin

is 2.8. 



IMAGING STUDIES:  Chest x-ray shows stable left basilar opacity.  CT of the abdomen

and pelvis demonstrates intrahepatic and extrahepatic biliary dilation with multiple

distal common bile duct filling defects, consistent with gallstones, largest

measuring 8 mm in size.  There is mild pancreatic ductal dilation.  The pancreas

otherwise appears normal.  There was diverticulosis and prostate enlargement. 



ASSESSMENT AND PLAN:  

1. Choledocholithiasis.

2. Elevated liver function tests. 

With new liver function test elevation and confirmed choledocholithiasis on imaging,

I do feel that that his recent low-grade fevers are likely secondary to the

choledocholithiasis.  Notably, he has no leukocytosis and is not having any

abdominal pain at this time.  I would go ahead and empirically put him on Zosyn, but

I do not have concern for cholangitis at this time.  I did discuss with the patient

and his family that ERCP is indicated with likely sphincterotomy and stone

extraction to clear the common bile duct.  There are risks to ERCP including post

ERCP pancreatitis, but on the other hand, he is at risk for cholangitis or

pancreatitis anyway if we do not proceed.  They expressed understanding and he

desires to proceed, so we will plan on ERCP tomorrow.  Please have the patient

n.p.o. after midnight. 



Following ERCP, surgical consultation would be reasonable to consider

cholecystectomy at some point in the future. 







Job ID:  [id number]

## 2019-12-31 NOTE — HP
CHIEF COMPLAINT:  Fever and generalized weakness.



HISTORY OF PRESENT ILLNESS:  An 82-year-old male with past medical history

significant for coronary artery disease, status post CABG, hypertension, and

dyslipidemia, who recently sustained cervical injury after a fall requiring

hospitalization between December 14 and December 20, 2019, here in this hospital,

during which he had cervical fusion for spinal stenosis with myelopathy.  The

patient was discharged to inpatient rehab for rehabilitative therapy.  The patient

has been doing well until yesterday when he reportedly developed fever associated

with nausea and generalized weakness.  CMP performed earlier today and evaluation

showed elevated transaminases, hence the patient was transferred over to the

hospital for further evaluation and treatment.  CT scan of the abdomen performed

showed biliary obstruction with extra and intrahepatic dilatation.  The patient

denied abdominal pain, chest pain, shortness of breath, fever, nausea, dysuria,

change in bowel habit.  He however admitted to poor oral intake in the last several

days as well as weakness.  He also reported weight loss of about 15 pounds in the

last 1 week due to poor oral intake.  The patient denied dysuria, hematuria, melena,

hematochezia, but admitted to urinary frequency.  He also denied prior history of

BPH, but admitted to prostatism including straining and hesitancy. 



PAST MEDICAL HISTORY:  

1. Coronary artery disease.

2. Hypertension.

3. Hyperlipidemia.

4. Asthma.

5. COPD.



PAST SURGICAL HISTORY:  

1. Coronary artery bypass graft.

2. Anterior cervical fusion and laminectomy.



ALLERGIES:  NO KNOWN DRUG ALLERGIES REPORTED.



MEDICATIONS:  Prior to hospital medications;

1. Acetaminophen 500 mg q.4 p.r.n.

2. MiraLAX 17 g p.o. daily.

3. Bisacodyl 10 mg suppository daily p.r.n. for constipation.

4. Calcium carbonate 500 mg q.6 p.r.n. for indigestion.

5. Cepacol 15 mg/3.6 mg mucous membrane lozenges b.i.d. p.r.n. for sore throat.

6. Clonidine 0.1 mg q.6 p.r.n. for elevation in blood pressure.

7. Loperamide 2 mg p.r.n. for diarrhea.

8. Guaifenesin 200 mg q.4 p.r.n. for cough.

9. Simethicone 80 mg t.i.d. p.r.n. for gas or flatulence.

10. Aspirin 81 mg enteric-coated p.o. daily.

11. Lipitor 20 mg p.o. daily at bedtime.

12. Breo Ellipta 200 mcg/25 mcg inhalation one puff daily.

13. Calcium with vitamin D 2 tablets daily.

14. Multivitamin with minerals 1 tablet p.o. daily.

15. Omega-3 fatty acid 1000 mg p.o. daily.

16. Protonix 40 mg p.o. daily.

17. Latanoprost 0.005 ophthalmic solution one drop right eye at bedtime.

18. Lisinopril 10 mg p.o. daily.

19. Tramadol 50 mg q.6 p.r.n. for pain.



FAMILY HISTORY:  Reviewed, but noncontributory.



SOCIAL HISTORY:  The patient currently was in the rehab facility.  Prior to that, he

was in hospital and was living prior to that even at home with family.  Used to

smoke, but quit about 15 years ago after about 15 years history of smoking.  Denied

alcohol or recreational drug use.  Wants to be full code with spouse being the

surrogate decision maker. 



REVIEW OF SYSTEMS:  12-point review of system performed was negative other than

pertinent positives and negatives included in the history of present illness. 



PHYSICAL EXAMINATION:

VITAL SIGNS:  Current vitals showed /63, pulse 74, respiratory rate 16,

temperature 98.4, pain 0/10, SpO2 96% on room air. 

GENERAL:  Elderly male, in no obvious distress.  Afebrile.  Anicteric.  Acyanotic. 

HEENT:  Normocephalic, atraumatic.  Oral mucosa is moist. 

NECK:  Supple.  Surgical dressings anteriorly and posteriorly noted.  No JVD

appreciated. 

CARDIOVASCULAR:  Regular rhythm with frequent ectopy.  Normal heart sounds 1 and 2. 

RESPIRATORY:  Fair air entry bilaterally with some transmitted breath sounds.  No

obvious rhonchi were appreciated.  Work of breathing is not increased. 

GI:  Full, soft, nontender, nondistended with normal bowel sounds. 

EXTREMITIES:  Grossly normal looking, atraumatic with no edema.  Skin however looked

dry. 

CNS:  Conscious, alert, and oriented x3 with appropriate mental status.  Cranial

nerves 2 through 12 are grossly intact.  The patient moves all extremities. 



DIAGNOSTIC DATA:  CBC showed WBC count of 7.5, hemoglobin of 12.3, MCV of 99.0, and

platelet of 135. 



CMP showed sodium 131, potassium 4.6, chloride 98, CO2 of 27, BUN 13, creatinine

1.02, glucose 103, calcium 8.8.  Total bilirubin 0.9, , , alkaline

phosphatase 371, total protein 6.5, albumin 2.8, globulin 3.7. 



Lipase is 29. 



Of note, however, earlier this morning, the patient had a sodium of 138, with

creatinine of 0.89.  AST of 244, ALT of 277, and alkaline phosphatase of 299, with

normal bilirubin of 0.7. 



Urinalysis performed today showed yellow clear urine with pH of 6.0, specific

gravity of 1.047, normal glucose, negative ketone, blood, nitrite, bilirubin, and

leukocyte esterase. 



Chest x-ray showed stable patchy left basilar opacity which related to an area of

atelectasis, scaring, or pneumonia. 



CT scan of the abdomen and pelvis with IV contrast showed patchy consolidation in

the lung bases as well as intrahepatic and extrahepatic biliary dilatation with

filling defects in the distal common bile duct consistent with choledocholithiasis.

The largest of these measures 8 mm.  The pancreatic duct also appears mildly

dilated.  The pancreas otherwise is normal.  No hepatic mass is seen.  The spleen,

adrenal glands, and right kidney are unremarkable.  There is an 8 mm low-density

lesion in the superior pole of left kidney, likely cyst.  There was no free air or

free fluid or lymphadenopathy seen in the abdomen or pelvis.  However, vascular

calcification without evidence of aneurysmal dilatation of the abdominal aorta was

noted.  Degenerative changes in the spine as well as a small fat containing

umbilical hernia was noted.  The small bowel is not abnormally dilated, but there is

colonic diverticulosis and the prostate is enlarged. 



ASSESSMENT:  

1. Cholelithiasis with choledocholithiasis and biliary dilatation.

2. Intra and extrahepatic biliary dilatation.

3. Abnormal liver function test.

4. Presumed cholangitis due to obstruction, biliary dilatation, and fever.

5. Bilateral lung atelectasis:  Possibility of pneumonia is considered.

6. Recent mechanical fall with cervical injury.

7. Cervical stenosis with myelopathy, status post laminectomy and anterior cervical

fusion. 

8. Dehydration with hyponatremia.

9. Hyponatremia:  Due to dehydration with appropriate ADH secretion.

10. Physical deconditioning.

11. Benign prostatic hyperplasia with prostatism.

12. Hypertension:  Blood pressure currently is soft.



PLAN:  

1. Admit the patient to medical floor.

2. Get EKG and serial troponin in preparation for surgery.

3. The patient recently had surgery.

4. We will start broad-spectrum antibiotics with anaerobic coverage.

5. We will also start IV fluid therapy.

6. We will also start the patient on Flomax and finasteride for BPH.

7. We will hold antihypertensives at this time.

8. GI consult has been requested.

9. We will keep the patient n.p.o. in preparation for planned ERCP.

10. General Surgery consult will be obtained after ERCP for cholecystectomy.

11. DVT prophylaxis with SCDs will be started.

12. We will hold aspirin for now due to planned procedure.

13. Further treatment to follow depending on hospital course.

14. Code status, full code.  Spouse is the surrogate decision maker.





Job ID:  [id number]

## 2020-01-01 NOTE — PDOC.HOSPP
- Subjective


Subjective: 





Patient going for ERCP. No abdominal pain. Hungry. Later I reviewed ERCP report 

with stone extraction. Will trend patient's LFT count. If the patient develops 

pain or worsening of symptoms he may need to be transferred to higher level of 

care. Patient on empiric antibiotics appropriately.





- Objective


Vital Signs & Weight: 


 Vital Signs (12 hours)











  Temp Pulse Resp BP Pulse Ox


 


 01/01/20 16:15  97.4 F L  63  18  117/69  98


 


 01/01/20 14:05  97.6 F  57 L  18  100/56 L  95


 


 01/01/20 08:00  98.0 F  73  20  118/69  94 L


 


 01/01/20 07:50      94 L








 Weight











Weight                         184 lb 5.76 oz














I&O: 


 











 12/31/19 01/01/20 01/02/20





 06:59 06:59 06:59


 


Intake Total   2050


 


Output Total   1500


 


Balance   550











Result Diagrams: 


 01/01/20 06:03





 01/01/20 06:03


Radiology Reviewed by me: Yes





Hospitalist ROS





- Review of Systems


All other systems reviewed; all pertinent +/- noted in HPI/Subj





- Medication


Medications: 


Active Medications











Generic Name Dose Route Start Last Admin





  Trade Name Freq  PRN Reason Stop Dose Admin


 


Finasteride  5 mg  01/01/20 09:00  01/01/20 09:11





  Proscar  PO   5 mg





  DAILY ZUHAIR   Administration





     





     





     





     


 


Piperacillin Sod/Tazobactam  100 mls @ 200 mls/hr  12/31/19 18:00  01/01/20 17:

23





  Sod 4.5 gm/ Sodium Chloride  IVPB   100 mls





  Q6HR ZUHAIR   Administration





     





     





     





     


 


Tamsulosin HCl  0.4 mg  01/01/20 09:00  01/01/20 09:10





  Flomax  PO   0.4 mg





  DAILY ZUAHIR   Administration





     





     





     





     














- Exam


General Appearance: NAD, awake alert


Eye: PERRL


ENT: moist mucosa


Neck: supple


Heart: no murmur, no gallops, no rubs, normal peripheral pulses


Respiratory: CTAB, no wheezes, no rales, no ronchi, normal chest expansion


Gastrointestinal: soft, non-tender, non-distended, no guarding, no rigidity


Extremities: no edema


Skin: no lesions


Neurological: cranial nerve grossly intact, no focal deficits


Musculoskeletal: generalized weakness


Psychiatric: normal affect, normal behavior





Hosp A/P


(1) Choledocholithiasis


Code(s): K80.50 - CALCULUS OF BILE DUCT W/O CHOLANGITIS OR CHOLECYST W/O OBST   

Status: Acute   





(2) Transaminitis


Code(s): R74.0 - NONSPEC ELEV OF LEVELS OF TRANSAMNS & LACTIC ACID DEHYDRGNSE   

Status: Acute   





(3) CAD (coronary artery disease)


Code(s): I25.10 - ATHSCL HEART DISEASE OF NATIVE CORONARY ARTERY W/O ANG PCTRS 

  Status: Chronic   





(4) Dyslipidemia


Code(s): E78.5 - HYPERLIPIDEMIA, UNSPECIFIED   Status: Chronic   





(5) HTN (hypertension)


Code(s): I10 - ESSENTIAL (PRIMARY) HYPERTENSION   Status: Chronic   


Qualifiers: 


   Hypertension type: essential hypertension   Qualified Code(s): I10 - 

Essential (primary) hypertension   





- Plan





Plan:


medical unit


gastroenterology consultation, recommendations appreciated


status post ERCP with stone extraction, please see for operative report for 

details


although there are small stones retained, pending clinical improvement the 

patient may be stable for repeat ERCP electively as an outpatient


empiric antibiotic coverage is appropriate


tolerating oral intake


continue other home medications as able


blood pressure control


blood sugar control


G.I. prophylaxis


DVT prophylaxis

## 2020-01-01 NOTE — OP
DATE OF PROCEDURE:  01/01/2020



ASSISTANT SURGEON:  None.



PROCEDURE:  Endoscopic retrograde cholangiopancreatography with biliary

sphincterotomy and stone extraction. 



INDICATION:  

1. Choledocholithiasis.

2. Elevated LFTs.



MEDICATIONS:  

1. See Anesthesia record.

2. Indomethacin 100 mg per rectum.



FINDINGS:  After discussion of the risks, benefits, and alternatives of the

procedure, informed consent was obtained and witnessed.  Pre-endoscopic

cardiopulmonary examination was satisfactory.  Time-out was performed before

sedation was achieved.  Sedation was achieved with Anesthesia assistance of the

patient under general anesthesia in the endoscopy unit.  The patient was placed in a

semi prone position.  A Pentax adult side-viewing duodenoscope was inserted into the

mouth and passed beyond the esophagus and stomach and into the second portion of the

duodenum.  The mucosa of the esophagus, stomach, and duodenum appeared normal.  In

the second portion of the duodenum, there was a complex deep diverticulum.  The

ampulla is small and deep within the diverticulum.  Visualization of the ampulla was

quite difficult.  Using a triple lumen dome tip sphincterotome and a 0.035

guidewire, I was able to selectively cannulate the common bile duct, despite the

difficulty with the anatomy.  The wire was passed up into the right intrahepatic

system.  Biliary cholangiogram was performed.  This demonstrates marked dilation of

the common bile duct as well as the right intrahepatic ducts.  The left intrahepatic

ducts do not fill as well, but do not seem to be as dilated.  Within the common bile

duct, there are multiple shadowing defects consistent with stones, of various sizes,

up to about 1 cm in diameter.  The sphincterotome was withdrawn to the level of the

ampulla.  Due to the small size of the ampulla as well as its location deep within

the complex diverticulum, I was quite careful with the sphincterotomy not to extend

it too far, and as a result, I was not able to get as large of a sphincterotomy as I

prefer.  However, I performed as largest sphincterotomy as I felt safe given his

anatomy.  At this point, a wire exchange was performed exchanging the sphincterotome

for an extraction balloon.  Multiple balloon sweeps were made of the common bile

duct.  This was quite difficult due to positioning as well as poor visualization of

the ampulla deep within the diverticulum.  The balloon was able to traverse the

ampulla just fine, inflated to 12 mm, but at 15 mm and 18 mm, I was not able to get

the balloon through the ampulla.  In this fashion, I was able to extract a single

small stone from the common bile duct, but unfortunately, I was unable to extract

the remainder of the stones.  At this point, the wire was then lost and I had great

difficulty in re-cannulating the common bile duct with the wire.  I was able to do

so and performed another sweep, but the wire was again lost.  There was excellent

flow of bile out of the ampulla at the end of the procedure with minimal hemorrhage.

 At this point, given the prolonged time of the procedure, it was decided to

complete the procedure despite retention of several stones within the common bile

duct.  The working apparatus was completely withdrawn.  The upper endoscope was

completely withdrawn suctioning out excess air and fluid and the procedure was

completed.  The patient tolerated the procedure well.  There were no immediate

postprocedure complications. 



IMPRESSION:  

1. Small ampulla deep within a complex duodenal diverticulum.  Only a small

sphincterotomy was possible. 

2. Dilated intra and extrahepatic bile ducts, with multiple mobile stones freely

floating in the common bile duct. 

3. Successful extraction of a single small stone from the common bile duct, but

unable to extract multiple other stones, due to the small sphincterotomy, difficulty

with the anatomy, and maintaining cannulation with balloon sweep. 



RECOMMENDATION:  

1. Advance diet as tolerated.

2. Trend the LFTs tomorrow.

3. If the patient remains clinically stable and asymptomatic, and LFTs are

improving, then we could potentially arrange for outpatient referral to Dr. Tim Johnson, for repeat ERCP and completion of stone extraction.  If the patient was to

develop symptoms again or LFTs were to spike, would consider an inpatient transfer

for the same. 





Job ID:  [id number]

## 2020-01-02 NOTE — PRG
DATE OF SERVICE:  01/02/2020



SUBJECTIVE:  Mr. Acevedo is doing very well.  He has been afebrile and otherwise

stable.  He has had no recurrence of any symptoms of abdominal pain or nausea.  He

is tolerating his regular diet.  LFTs are trending down. 



OBJECTIVE:  VITAL SIGNS:  Temperature 98.0, pulse 72, blood pressure 126/64, 96%

oxygen saturation on room air. 

GENERAL:  No acute distress. 

HEART:  Regular rate and rhythm. 

LUNGS:  Clear to auscultation bilaterally. 

ABDOMEN:  Bowel sounds present.  Soft and nontender to deep palpation throughout. 

EXTREMITIES:  No peripheral edema.



LABORATORY STUDIES:  WBC 7.8, hemoglobin 10.3, platelets 107.  Sodium 137, potassium

4.0, BUN 17, creatinine 1.07, total bilirubin is down to 0.4, AST is down to 125,

ALT down to 210, alkaline phosphatase down to 230, albumin 2.3.  Viral hepatitis

serologies, all negative. 



ASSESSMENT AND PLAN:  

1. Choledocholithiasis, multiple stones which are mobile within the dilated distal

common bile duct. 

2. Status post Endoscopic retrograde cholangiopancreatography with biliary

sphincterotomy, but incomplete removal of common bile duct stones. 

3. Large complex duodenal diverticulum with small ampulla within the diverticulum,

this made the Endoscopic retrograde cholangiopancreatography quite technically

difficult. 

4. Elevated LFTs, all improving as expected.

5. Fever, present just prior to admission, afebrile here the past 2 days. 



I had a long discussion again with Mr. Acevedo and his family members regarding his

choledocholithiasis.  This was a technically challenging procedure, and he still has

several stone fragments within the common bile duct.  On the other hand, he is

completely asymptomatic and afebrile at this point with downtrending LFTs.  I think

it would be reasonable to refer him for repeat ERCP at a tertiary center, but this

could be arranged in the outpatient setting.  From a GI standpoint, the patient

could be discharged home.  We will plan to have him follow up with me within the

next month, recheck LFTs at that time.  If he is still willing at that point, we

would likely refer him down to Dr. Tim Johnson in [place] for repeat endoscopic

retrograde cholangiopancreatography and hopefully completion of stone extraction. 



Please call back anytime with questions or concerns.







Job ID:  [id number]

## 2020-01-02 NOTE — DIS
DATE OF ADMISSION:  12/31/2019



DATE OF DISCHARGE:  01/02/2020



REASON FOR HOSPITALIZATION:  Abdominal pain.



SIGNIFICANT FINDINGS:  The patient was found to have choledocholithiasis and

required surgical intervention. 



PROCEDURES PERFORMED AND TREATMENTS RENDERED:  The patient underwent ERCP by Dr. Dubon on 01/01/2020-please see full operative report for details.  Surgery was

successful and stones were removed.  However, there were some stones retained and

Dr. Dubon will follow up with this in the outpatient setting.  The patient tolerated

the procedure well without intraoperative complications and had a good postoperative

recovery.  The patient tolerating regular diet.  The patient moving bowels.  The

patient ambulating without difficulties.  The patient was recommended safe for

discharge by Gastroenterology with close followup in the outpatient setting. 



CONDITION ON DISCHARGE:  Stable.



SPECIFIC INSTRUCTIONS FOR THE PATIENT/FAMILY:  

1. The patient is recommended to complete a full course of oral antibiotics.

2. The patient recommended to take all other medications as directed.

3. The patient is recommended to follow up with primary care physician in the next 5

to 7 days. 

4. The patient is recommended to follow up with Gastroenterology in the next 2 to 4

weeks. 

5. The patient is recommended to return to acute care hospital immediately if signs

or symptoms return, worsen, or any other new symptoms occur. 



DISCHARGE MEDICATIONS:  Please see full discharge medication list for details with

the following new medications; 

1. Augmentin 1 tablet p.o. b.i.d. for the next 7 days, 14 tablets.

2. Lactobacillus acidophilus 1 tablet p.o. t.i.d. for the next 14 days, 42 tablets.

3. Finasteride 5 mg one tablet p.o. daily.

4. Tamsulosin 0.4 mg one tablet p.o. daily. 

All other medications were continued without changes. 



Greater than 37 minutes spent coordinating care and discharge process for this

patient. 







Job ID:  [id number]

## 2020-01-02 NOTE — PDOC.HOSPP
- Subjective


Subjective: 





Doing well this a.m. Tolerating diet. No abdominal pain. Liver function test 

down trending. Patient breathing comfortably on room air. All questions 

answered in detail, patient is happy with plan of care.





- Objective


Vital Signs & Weight: 


 Vital Signs (12 hours)











  Temp Pulse Resp BP BP Pulse Ox


 


 01/02/20 12:34  98.0 F  72  18  126/64   96


 


 01/02/20 08:01  98.3 F  73  18  104/58 L   94 L


 


 01/02/20 08:00       94 L


 


 01/02/20 05:59  98.2 F  62  18   112/54 L  96








 Weight











Weight                         184 lb 5.76 oz














I&O: 


 











 01/01/20 01/02/20 01/03/20





 06:59 06:59 06:59


 


Intake Total  2590 900


 


Output Total  1500 


 


Balance  1090 900











Result Diagrams: 


 01/02/20 06:21





 01/02/20 06:21


Radiology Reviewed by me: Yes





Hospitalist ROS





- Review of Systems


All other systems reviewed; all pertinent +/- noted in HPI/Subj





- Medication


Medications: 


Active Medications











Generic Name Dose Route Start Last Admin





  Trade Name Freq  PRN Reason Stop Dose Admin


 


Finasteride  5 mg  01/01/20 09:00  01/02/20 08:46





  Proscar  PO   5 mg





  DAILY ZUHAIR   Administration





     





     





     





     


 


Piperacillin Sod/Tazobactam  100 mls @ 200 mls/hr  12/31/19 18:00  01/02/20 11:

30





  Sod 4.5 gm/ Sodium Chloride  IVPB   100 mls





  Q6HR ZUHAIR   Administration





     





     





     





     


 


Tamsulosin HCl  0.4 mg  01/01/20 09:00  01/02/20 08:46





  Flomax  PO   0.4 mg





  DAILY ZUHAIR   Administration





     





     





     





     














- Exam


General Appearance: NAD, awake alert


Eye: PERRL


ENT: normocephalic atraumatic, moist mucosa


Neck: supple, symmetric, no lymphadenopathy


Heart: no murmur, no gallops, no rubs


Respiratory: CTAB, no wheezes, no rales, no ronchi, normal chest expansion


Gastrointestinal: soft, non-tender, no guarding, no rigidity


Extremities: no edema


Skin: no lesions, no rashes


Neurological: cranial nerve grossly intact, no focal deficits


Musculoskeletal: generalized weakness


Psychiatric: normal affect, normal behavior, A&O x 3





Hosp A/P


(1) Choledocholithiasis


Code(s): K80.50 - CALCULUS OF BILE DUCT W/O CHOLANGITIS OR CHOLECYST W/O OBST   

Status: Acute   





(2) Transaminitis


Code(s): R74.0 - NONSPEC ELEV OF LEVELS OF TRANSAMNS & LACTIC ACID DEHYDRGNSE   

Status: Acute   





(3) CAD (coronary artery disease)


Code(s): I25.10 - ATHSCL HEART DISEASE OF NATIVE CORONARY ARTERY W/O ANG PCTRS 

  Status: Chronic   





(4) Dyslipidemia


Code(s): E78.5 - HYPERLIPIDEMIA, UNSPECIFIED   Status: Chronic   





(5) HTN (hypertension)


Code(s): I10 - ESSENTIAL (PRIMARY) HYPERTENSION   Status: Chronic   


Qualifiers: 


   Hypertension type: essential hypertension   Qualified Code(s): I10 - 

Essential (primary) hypertension   





- Plan





Plan:


medical unit


gastroenterology consultation, recommendations appreciated


status post ERCP with stone extraction, please see for operative report for 

details


although there are small stones retained, pending clinical improvement the 

patient may be stable for repeat ERCP electively as an outpatient


empiric antibiotic coverage is appropriate


tolerating oral intake


No nausea, vomiting, or abdominal pain


continue other home medications as able


blood pressure control


blood sugar control


G.I. prophylaxis


DVT prophylaxis

## 2020-01-11 NOTE — RAD
Chest AP view



INDICATION: Sepsis



COMPARISON: December 31, 2019



FINDINGS:



Lungs:There are worsening bibasilar airspace opacities.



Cardiac silhouette:The cardiomediastinal silhouette appears within normal limits. Post-CABG changes s
table.



Pulmonary vasculature:Normal



Pleural spaces:No pleural effusion or pneumothorax is demonstrated.



Upper abdomen:No abnormality seen.



Osseous structures: No acute osseous abnormality.



Additional findings:None.



IMPRESSION: Worsening bibasilar airspace opacities suspicious for worsening pneumonia.



Reported By: Serg Reyes 

Electronically Signed:  1/11/2020 9:40 PM

## 2020-01-11 NOTE — CT
CT Brain WO Con:



1/11/2020 9:22 PM



CLINICAL HISTORY: History of fall and weakness.



IMAGING TECHNIQUE: Multiple CT images were obtained of the brain without IV contrast.



COMPARISON: CT the brain dated March 28, 2017 and April 18, 2017.



FINDINGS:

Brain:  No acute infarct or hemorrhage is evident.  No midline shift.

Ventricles: Normal.  No hydrocephalus..

Skull: There is a stable left frontal erik hole. No acute osseous abnormality.

Visualized Paranasal sinuses: Clear..

Mastoid air cells:Clear.

Extracranial soft tissues:There is stable radiopaque foreign body measuring 1.3 mm within the anterio
r left lobe.



IMPRESSION:



No acute intracranial abnormality.

Stable radiopaque foreign body within the left anterior eye.



Reported By: Serg Reyes 

Electronically Signed:  1/11/2020 9:55 PM

## 2020-01-12 NOTE — CON
DATE OF CONSULTATION:  01/12/2020



REASON FOR CONSULT:  Possible cholangitis.



HISTORY PRESENT ILLNESS:  Mr. Acevedo is an 82-year-old gentleman, who was recently

discharged from the hospital on 01/02/2020.  At that time, he was in the hospital

with abdominal pain and was found to have gallstones and elevated liver enzymes.  He

had an ERCP with removal of some stones, but the procedure was very difficult of the

periampullary diverticula and even a stent could not ultimately be placed at the end

of that procedure and the plans were to send him to [place] at a later date for

followup ERCP.  The patient was sent home with oral antibiotics including Augmentin.

 The patient, however, returned early this morning about 1:00 in the morning with

complaints of weakness and a fall at home.  Other history revealed that when the

EMTs got there, he was tachycardic with a fever of 102.  The patient's family

reports that he had some garcia and eggs yesterday and after that began to have some

abdominal pain and feel weak again.  They note a history of some chills at home as

well.  Here, his blood pressure was initially 95/50 with a pulse of 115, it came up

to 114/59 with a pulse of 102, temperature of 99.  He was started on vancomycin and

Levaquin and given IV fluid bolus in the emergency room.  Blood cultures were

obtained as well. 



Presently, he is feeling better.  His LFTs were elevated, it had come down a little

bit.  His temperature has come down with antibiotics as well. 



PAST MEDICAL HISTORY:  Coronary artery disease, remote CABG, hypertension,

dyslipidemia, recent hospitalization for the spinal injury with surgery as described

above.  He has asthma, hypertension, hyperlipidemia, reflux, and prior UTIs. 



PAST SURGICAL HISTORY:  In 12 of 2019, he had anterior and posterior cervical

fusions secondary to a fall and head injury with contusion of spinal cord and

quadriparesis.  He improved with that surgery quite a bit.  He has had a remote

history of coronary artery bypass surgery and the ERCP with Dr. Dubon as noted. 



ALLERGIES:  NONE KNOWN.



MEDICATIONS:  Home medications:  

1. Travatan.

2. Flomax.

3. Senokot.

4. Protonix.

5. Fish oil.

6. Multivitamin.

7. Dulera inhaler.

8. Zestril.

9. Acidophilus.

10. Advair Diskus inhaler.

11. Proscar.

12. Calcium.

13. Lipitor.

14. Aspirin.

15. Augmentin. 



Present medications:  

1. Tylenol.

2. Acidophilus.

3. Aspirin.

4. Calcium.

5. Proscar.

6. Fish oil.

7. Lansoprazole.

8. Zestril.

9. Dulera.

10. Multivitamin.

11. Protonix.

12. Zosyn.

13. Senokot p.r.n. 

14. Normal saline 100 mL an hour.



REVIEW OF SYSTEMS:  Negative for cough or shortness of breath.  Negative for

headache.  He is getting around better at home now.  Negative for history of

bleeding disorders.  Negative for use of anticoagulation.  Negative for dysuria,

frequency, or urgency. 



PHYSICAL EXAMINATION:

VITAL SIGNS:  Temperature since admission T-max 100.9 at 4:00 this morning,

presently 98; pulse is 104, then is 87 now; blood pressure 103/53; saturation on

room air is 93%. 

GENERAL:  The patient is resting comfortably in bed with family members at the

bedside.  He is alert and oriented.  He does not know all of his history, but he is

a fairly decent historian.  His skin is warm and dry.  Mucous membranes are slightly

dry.  He is not tachypneic.  He is breathing easily. 

LUNGS:  Clear bilaterally. 

HEART:  Has regular rate and rhythm. 

ABDOMEN:  Soft and nontender with no rebound or guarding. 

EXTREMITIES:  No clubbing, cyanosis, or edema.



LABORATORY STUDIES:  White count 12.4 at 4:00 this morning, it was 9 at 2100 hours

last night; hemoglobin 10.1; platelet count 136; 84% segs.  INR is 1.1 on 12/14.

Chemistries; sodium is 136, potassium 4.5, BUN and creatinine are 10 and 0.8.

Bilirubin is 1.4, it was 1.2 last evening, it was 0.4 on discharge.  His AST and ALT

are 353 and 219, they were 416 and 184 yesterday, they were 125 and 210 on

discharge.  His alkaline phosphatase is 79, albumin 2.3, protein is 5.9.  Lipase was

not checked.  On January 1st, his hepatitis A, B, and C were negative.  Microbiology

is pending. 



IMAGING DATA:  Abdominal ultrasound just after midnight today showed continued

dilation of common duct of 1.4 cm, circumferential wall thickening of the

gallbladder, mild dilatation of the pancreatic duct.  Brain CT, no acute

intracranial abnormalities.  Chest x-ray reported bibasilar airspace opacities.  CAT

scan on 12/31/2019 showed possible pneumonia at that time, biliary duct dilatation

and choledocholithiasis, diverticulosis, prostatic enlargement. 



ASSESSMENT:  

1. Likely low-grade cholangitis secondary to choledocholithiasis.

2. There is report of pneumonia.  However, his lungs sound very clear.  I do not

think this is the focus of his problem. 

3. Recent episode of fall with requiring neurosurgical treatment in December.  It

seems he is improved from that. 

4. Difficult endoscopic retrograde cholangiopancreatography at last admission on

January 1st.  This records were reviewed.  A sphincterotomy was obtained, but stones

could not be completely removed and because of his periampullary diverticula, they

could not get stents in the common duct. 



RECOMMENDATIONS:  

1. Check a lipase and an INR.

2. Surgical consultation.

3. Re-attempt ERCP today.  I discussed with the patient and family the risks,

benefits, and possible complications of procedure including perforation, bleeding,

reaction to medication, aspiration, and pancreatitis.  I also discussed the

difficult of procedure encountered last time and the possibility that we may not be

able to remove the stones or get a stent into the bile duct, but I think that

because of his fever on presentation and chills and likelihood this represents

cholangitis that this needs to be done more emergently as opposed to waiting for

transfer to an outside facility.  If we can get him drained, we can consider PTC if

needed or surgery can proceed with laparoscopic cholecystectomy and common duct

exploration as he is fairly stable, not improving with antibiotics and shows no

signs of sepsis. 







Job ID:  [id number]

## 2020-01-12 NOTE — RAD
INTRAPROCEDURE FLUOROSCOPY FOR ERCP:



HISTORY:

Repeat ERCP.



COMPARISON:

None.



FINDINGS:

Intraprocedure fluoroscopy demonstrates retrograde opacification of a dilated common bile duct and ce
ntral intrahepatic biliary system. Initial images demonstrate filling defects in the distal common

bile duct which may represent choledocholithiasis versus small air bubbles. Contrast refluxes into th
e gallbladder via the cystic duct. Last image demonstrates a balloon that has been inflated in the

common bile duct.



IMPRESSION:

Intraprocedure fluoroscopy as above.



Transcribed Date/Time: 1/12/2020 1:21 PM



Reported By: Nehemias Montiel 

Electronically Signed:  1/12/2020 1:25 PM

## 2020-01-12 NOTE — ULT
US Gallbladder RUQ



History: Fever. Elevated liver enzymes



Comparison: None.



Findings: Real-time grayscale and color evaluation of the right upper quadrant was performed.



Mild dilatation of the pancreatic duct up to 4 mm. The common bile duct is massively dilated at 1.4 c
m. Mild wall thickening of the gallbladder without significant distention.



Right kidney is without mass, hydronephrosis, or abnormal calcifications. Hepatic echotexture is norm
al.



Visualized portion of the aorta and IVC are unremarkable.



Impression: 

1. Continued dilatation of the common bile duct up to 1.4 cm.

2. Circumferential wall thickening of the gallbladder may be sequela of congestive changes as there i
s no dilatation to suggest: Cystitis.

3. Mild dilatation of the pancreatic duct measures 4 mm. Recommend correlation of recent ERCP.



Reported By: Rene Sidhu 

Electronically Signed:  1/12/2020 8:19 AM

## 2020-01-12 NOTE — PDOC.EVN
Event Note





- Event Note


Event Note: 





Seen on his way down to endoscopy unit. Patient being treated by GI for 

choledocholitiasis. Had recent ERCP with known retained stones. Bacteremia on 

broad spectrum ABX. De escalate ABX to culture and sensitivity as able. 

Continue current therapy.

## 2020-01-12 NOTE — OP
DATE OF PROCEDURE:  01/12/2020



PROCEDURE PERFORMED:  Endoscopic retrograde cholangiopancreatography with removal of

common bile duct stone. 



PREPROCEDURE DIAGNOSES:  Choledocholithiasis and cholangitis.



POSTPROCEDURE DIAGNOSES:  Choledocholithiasis and cholangitis.



ANESTHESIA:  General endotracheal anesthesia.



RECOMMENDATIONS:  Consult General Surgery tomorrow for laparoscopic cholecystectomy.



PROCEDURE IN DETAIL:  After the patient was informed of the risks, benefits, and

possible complications of ERCP including perforation, bleeding, reaction to

medication, aspiration, pancreatitis as well as risks not being able to remove all

the stones.  Informed consent was obtained.  The patient was brought to endoscopy

suite, where he was sedated in gradual fashion.  Once he was comfortable, he was

intubated and placed in prone position on fluoroscopy table.  A  film was

obtained.  The endoscope was advanced through the bite block into the incisor

orifice.  The esophagus was carefully intubated.  The endoscope was advanced to the

esophagus, stomach, and second and third portions of the duodenum.  The ampulla was

brought into view.  It was in the base of the diverticula.  Large previous

sphincterotomy was noted.  No bile was emanating from this.  Cannulation was

obtained with assistance of a guidewire and a sphincterotome and we switched over to

an 18 mm balloon.  The duct was reported to be 15 mm in size plus.  There were

multiple filling defects in the gallbladder.  Initially cannulated the cystic duct,

but we were able to just get into the main common bile duct and intrahepatic ducts

with a guidewire.  Exchange was then made for the balloon catheter.  This was lost

temporarily, but then we were able to get recannulated, get back into the

intrahepatic ducts with the guidewire and then placed 18 mm balloon above the cystic

duct, and into the intrahepatic ducts, was brought back in the common hepatic duct

and inflated.  Occlusion cholangiogram showed normal intrahepatic ducts.  There was

a filling defect noted in the distal common bile duct.  It was swept through a large

stone and was removed.  There was no pus present.  Multiple other small stones and

debris were removed about 2 or 3.  At the termination of procedure, we were able to

bring the 15 mm balloon through the ampulla and 18 mm balloon through the entire

duct with no filling defects noted.  There was good drainage of contrast both

endoscopically and radiographically.  The scope was then removed.  The patient was

returned to the stretcher and extubated and brought to recovery room in stable

condition. 



RECOMMENDATIONS:  He will need a laparoscopic cholecystectomy.  It seems he has

acute cholecystitis by the findings of his ultrasound at this time. 







Job ID:  [id number]

## 2020-01-12 NOTE — HP
CHIEF COMPLAINT:  Abdominal pain, weakness, rigors x1 day.



HISTORY OF PRESENT ILLNESS:  An 82-year-old  male with past 
medical

history of coronary artery disease, status post remote CABG, hypertension,

dyslipidemia, and recent hospitalization for cervical spinal stenosis, requiring

C4-C7 ACDF with C4-C7 laminectomy on 12/14/2019, who was subsequently 
discharged to

acute rehab facility and recently readmitted for abdominal pain secondary to

choledocholithiasis, requiring ERCP with stone extraction on 01/01/2020, who

presents to [facility] ER for a 1-day history of abdominal pain associated with

rigors and chills and weakness that started this evening, associated with 
nausea in

the absence of vomiting, diaphoresis, subjective fevers, prompting further

evaluation.  I was asked to admit the patient for pneumonia, but the patient 
denies

any complaints of chest pain, shortness of breath, productive cough, or any

respiratory complaints whatsoever to me.  In the ER, CBC was unremarkable, but

vitals revealed a T-max of 102.6.  Chemistries revealed a lactic acid of 2.5 
with

worsening elevation of liver enzymes with AST of 416, , alkaline 
phosphatase

311 and notable total bilirubin of 1.2.  No abdominal imaging was obtained.  A

noncontrast head CT was unremarkable.  One-view chest x-ray suggested worsening

bibasilar airspace opacity suspicious for worsening pneumonia; of note, recent 
CT

abdomen and pelvis on recent admission suggested bibasilar opacities as well.

Urinalysis was not obtained.  The patient was administered 750 mg IV Levaquin, 
25

mg/kg IV vancomycin, 2 g IV cefepime, 1 L normal saline bolus and admitted for

further inpatient evaluation. 



At bedside, the patient is accompanied by spouse and daughter-in-law.  He 
denies any

current complaints of abdominal pain.  Daughter-in-law notes patient ate eggs 
and

grits for breakfast and has been complaining of abdominal discomfort all day.  
This

evening he was on the toilet with inability to have a bowel movement and around 
8 to

9 p.m. began to have chills with rigors and associated nausea and was so weak 
he could not get up, prompting further

evaluation.  The patient reports dark color urine and states he has not been

drinking well.  He denies any anginal complaints.  He denies any complaints of 
neck

pain or stiffness and has been having full range of mobility of neck. 



PAST MEDICAL HISTORY:  Coronary artery disease, status post remote CABG,

hypertension, dyslipidemia, recent hospitalization for a cervical spinal 
stenosis

requiring anterior cervical diskectomy with cervical spinal fusion and cervical

laminectomy, repeat hospitalization for choledocholithiasis requiring ERCP with

sphincterotomy. 



PAST SURGICAL HISTORY:  Remote CABG, recent anterior cervical diskectomy with 
fusion

and cervical laminectomy on 12/14/2019, Dr. Damico, recent ERCP with 
sphincterotomy

on 01/01/2020, Dr. Dubon. 



SOCIAL HISTORY:  The patient is , lives at his home with his spouse.  
Denies

tobacco or alcohol use.  Uses a walker to ambulate. 



ALLERGIES:  NONE REPORTED.



REVIEW OF SYSTEMS:  Pertinent positives as per HPI.  Remainder of review of 
systems

negative. 



MEDICATIONS:  Home medications will be reviewed as per admission medication

reconciliation. 



FAMILY HISTORY:  The patient denies any chronic medical comorbidities in family

members. 



PHYSICAL EXAMINATION:

VITAL SIGNS:  T-max reported as 102.6 per ER physician and the patient's family

member, but documented blood pressures reveal a T-max 99.9, pulse 72 to 118,

respirations 14 to 16, unlabored.  Blood pressure ranges from 95/50 to 124/56,

oxygen saturation 92% to 97% on room air. 

GENERAL APPEARANCE:  This is an elderly male, awake, alert, oriented, coherent,

lucid, not in any obvious distress, nontoxic in appearance. 

HEENT:  Normocephalic, atraumatic.  No facial asymmetry.  Pupils equally round.

Extraocular muscles intact.  No conjunctival pallor or scleral icterus noted. 

NECK:  Supple.  There is posterior neck healing incision site.  Nontender to the

touch with passive range of motion intact. 

CARDIOVASCULAR:  S1, S2.  Regular rhythm and tachycardic.  No chest wall 
tenderness

to palpation.  No harsh murmurs.  Median sternotomy scar noted. 

LUNGS:  Bilateral equal air entry on anterior auscultation.  Nonlabored

respirations.  No wheezing or rales. 

ABDOMEN:  Soft, nondistended, nontender to palpation in right upper quadrant.

Normoactive bowel sounds.  No peritoneal signs appreciated. 

EXTREMITIES:  No edema, cyanosis, or deformities of bilateral lower 
extremities. 

SKIN:  Warm to touch without rash, pallor, or abrasion.



LABORATORY VALUES:  Sodium 137, potassium 4.4, chloride 102, bicarb 25, glucose 
159,

BUN and creatinine 12/1.03, GFR of 84, total bilirubin 1.2, albumin 3.0, 
alkaline

phosphatase 311, , .  Lactic acid 2.5.  WBC 9.0, H and H 11.9/37.2
,

platelets of 160. 



IMAGING DATA:  One-view chest x-ray suggest worsening bibasilar airspace opacity

suspicious for worsening pneumonia.  A noncontrast head CT reveals no acute

intracranial abnormality. 



ASSESSMENT:  

1. Possible sepsis present on admission.  The patient noted to have evidence of

fevers, sinus tachycardia, and complaints of abdominal pain on presentation and

certainly biliary etiology must be excluded noting recent history of

choledocholithiasis requiring endoscopic retrograde cholangiopancreatography 
with

sphincterotomy.  The patient will be admitted as inpatient status.  He received 
1 L

IV fluid bolus in the emergency room and will bolus remainder of IV fluids per

sepsis protocol.  Blood cultures were obtained in the ER.  No abdominal imaging 
has

been obtained with worsening elevated liver enzymes and we will obtain right 
upper

quadrant ultrasound to evaluate for symptomatic cholelithiasis or concerns for

possible choledocholithiasis.  The patient recently hospitalized without

hyperbilirubinemia, but with CT and pelvis noted concerning for intrahepatic and

extrahepatic biliary ductal dilatation requiring endoscopic retrograde

cholangiopancreatography with sphincterotomy.  We will continue the patient on 
IV

Zosyn for empiric intraabdominal coverage as well as concomitant pneumonia 
coverage.

 Noted chest x-ray is concerning for bibasilar consolidations, which was 
evident on

prior CT abdomen and pelvis, but the patient denies any respiratory complaints 
and

is not tachypneic or hypoxic. 

2. Elevated liver enzymes concerning for biliary colic or possible

choledocholithiasis.  We will obtain right upper quadrant ultrasound, monitor 
LFTs,

obtain urinalysis to evaluate for bilirubinuria, and we will consider GI or 
Surgery

consultation based on findings. 

3. History of recent anterior cervical diskectomy with fusion with cervical

laminectomy for cervical spinal stenosis resulting from a fall.  The patient 
denies

any complaints of neck pain with full range of motion intact and has been 
healing

well since rehabilitation. 

4. Coronary artery disease with remote coronary artery bypass graft history.  We

will resume home medications once appropriately reconciled. 

5. History of recent endoscopic retrograde cholangiopancreatography with

sphincterotomy for choledocholithiasis. 

6. Lactic acidosis, possibly due to sepsis.  We will trend.

7. Hypertension, benign.

8. Dyslipidemia.



Deep venous thrombosis prophylaxis:  We will continue SCDs for now as unclear

plans for any further intervention. 



Code status:  Unspecified. We will maintain patient full code at this time.



Disposition:  Inpatient admission to telemetry unit.







Job ID:  [id number]



MTDD

## 2020-01-13 NOTE — PRG
DATE OF SERVICE:  01/13/2020



SUBJECTIVE:  Mr. Acevedo says he is feeling well.  There is no abdominal pain or

nausea today.  He has been afebrile and hemodynamically stable overnight, continuing

on IV vancomycin and Zosyn.  I believe the plan is for cholecystectomy later today. 



OBJECTIVE:  VITAL SIGNS:  Temperature 98.1, pulse 54, blood pressure 128/53, 98%

oxygen saturation on room air. 

GENERAL:  No acute distress. 

HEART:  Regular rate and rhythm. 

LUNGS:  Clear to auscultation bilaterally. 

ABDOMEN:  Soft and nontender. 

EXTREMITIES:  No peripheral edema.



LABORATORY STUDIES:  WBC down from 12.4 to 9.1, hemoglobin is 9.3, platelets 139.

Sodium 137, potassium 3.9, BUN 13, creatinine 0.95, glucose 123.  Total bilirubin is

down from 1.4 to 0.5, alkaline phosphatase is down from 279 to 216, AST is down from

353 to 140, ALT is down from 219 to 141.  Lipase is 7. 



ASSESSMENT AND PLAN:  

1. Choledocholithiasis.

2. Cholangitis. 



He is now status post repeat endoscopic retrograde cholangiopancreatography with Dr. Mooney yesterday and successful extraction of some debris and a couple of small

stones from the distal common bile duct.  Notably, Dr. Mooney felt the

sphincterotomy was good size and drainage was good following the procedure.  The

patient has done well since then.  Agree with plan for laparoscopic cholecystectomy

to try to prevent future episodes.  Note that endoscopic retrograde

cholangiopancreatography is quite difficult due to the location of his small ampulla

within a deep duodenal diverticulum. 



Please call anytime if we can be of further assistance from a GI standpoint.







Job ID:  [id number]

## 2020-01-13 NOTE — PDOC.HOSPP
- Subjective


Encounter Date: 01/13/20


Encounter Time: 15:30


Subjective: 





Patient seen and examined for Sepsis. s/p Lap katya. Pain controlled. No N/V. 

No new complaints. No overnight events





- Objective


Vital Signs & Weight: 


 Vital Signs (12 hours)











  Temp Pulse Resp BP Pulse Ox


 


 01/13/20 20:00  97.8 F  52 L  16  125/61  95


 


 01/13/20 16:00  97.5 F L  57 L  20  116/56 L  96


 


 01/13/20 12:00  98.7 F  60  18  116/52 L  98








 Weight











Weight                         176 lb 5.917 oz














I&O: 


 











 01/12/20 01/13/20 01/14/20





 06:59 06:59 06:59


 


Intake Total  1550 


 


Output Total  750 


 


Balance  800 











Result Diagrams: 


 01/14/20 05:28





 01/14/20 05:28


Additional Labs: 





Microbiology





01/12/20 00:29   Urine voided   Urine Culture - Preliminary


                              Presumptive Enterococcus sp.


01/11/20 21:08   Venous blood - Right Hand   Blood Culture - Preliminary


                              Gram Positive Cocci


01/11/20 21:08   Venous blood - Left Arm   Blood Culture - Preliminary


                              Vanc-resistant Enterococcus








Radiology Reviewed by me: Yes (CXR - ? infiltrate)





Hospitalist ROS





- Review of Systems


Respiratory: denies: cough, dry, shortness of breath, hemoptysis, SOB with 

excertion, pleuritic pain, sputum, wheezing, other


Cardiovascular: denies: chest pain, palpitations, orthopnea, paroxysmal noc. 

dyspnea, edema, light headedness, other





- Medication


Medications: 


Active Medications











Generic Name Dose Route Start Last Admin





  Trade Name Godwinq  PRN Reason Stop Dose Admin


 


Acetaminophen  650 mg  01/13/20 18:00  01/13/20 23:34





  Tylenol  PO   650 mg





  Q6HR ZUHAIR   Administration





     





     





     





     


 


Acidophilus  1 tab  01/12/20 15:00  01/13/20 20:40





  Floranex  PO   1 tab





  TID ZUHAIR   Administration





     





     





     





     


 


Aspirin  81 mg  01/13/20 09:00  01/13/20 09:28





  Ecotrin  PO   Not Given





  DAILY ZUHAIR   





     





     





     





     


 


Calcium/Vitamin D  1 tab  01/12/20 09:00  01/13/20 10:14





  Caltrate 600 + Vit D  PO   Not Given





  DAILY ZUHAIR   





     





     





     





     


 


Finasteride  5 mg  01/13/20 09:00  01/13/20 10:15





  Proscar  PO   Not Given





  DAILY ZUHAIR   





     





     





     





     


 


Fish Oil  1,000 mg  01/13/20 09:00  01/13/20 10:15





  Fish Oil  PO   Not Given





  DAILY ZUHAIR   





     





     





     





     


 


Sodium Chloride  1,000 mls @ 100 mls/hr  01/12/20 00:30  01/13/20 17:04





  Normal Saline 0.9%  IV   1,000 mls





  .Q10H ZUHAIR   Administration





     





     





     





     


 


Piperacillin Sod/Tazobactam  100 mls @ 200 mls/hr  01/12/20 04:00  01/13/20 20:

46





  Sod 3.375 gm/ Sodium Chloride  IVPB   100 mls





  0400,1000,1600,2200 ZUHAIR   Administration





     





     





     





     


 


Linezolid 600 mg/ Device  300 mls @ 150 mls/hr  01/13/20 12:00  01/13/20 23:34





  IVPB   300 mls





  1200,2359 ZUHAIR   Administration





     





     





     





     


 


Latanoprost  1 drop  01/12/20 21:00  01/13/20 20:40





  Xalatan 0.005% Ophth Soln  R EYE   1 drop





  HS ZUHAIR   Administration





     





     





     





     


 


Lisinopril  10 mg  01/12/20 21:00  01/13/20 20:45





  Zestril  PO   Not Given





  [facility]   





     





     





     





     


 


Mometasone Furoate/Formoterol Fumar  2 puff  01/12/20 18:30  01/13/20 07:27





  Dulera 100 Mcg/5 Mcg Inhaler  INH   2 puff





  BID-RT PRN   Administration





  SEASONAL DYSPNEA   





     





     





     


 


Multivitamins  1 tab  01/12/20 09:00  01/13/20 10:16





  Theragran  PO   Not Given





  DAILY ZUHAIR   





     





     





     





     


 


Pantoprazole Sodium  40 mg  01/12/20 09:00  01/13/20 10:16





  Protonix  PO   Not Given





  [facility]   





     





     





     





     


 


Sodium Chloride  10 ml  01/12/20 09:00  01/13/20 20:46





  Flush - Normal Saline  IVF   10 ml





  Q12HR ZHUAIR   Administration





     





     





     





     


 


Tamsulosin HCl  0.4 mg  01/12/20 09:00  01/13/20 10:16





  Flomax  PO   Not Given





  DAILY ZUHAIR   





     





     





     





     














- Exam


General Appearance: NAD


Heart: RRR, no gallops, no rubs, normal peripheral pulses


Respiratory: no wheezes, no rales, normal chest expansion, rhonchi


Gastrointestinal: soft, normal bowel sounds, no guarding, no rigidity


Extremities: no cyanosis, no clubbing


Psychiatric: normal affect, A&O x 3





Hosp A/P





- Plan


PT/OT, speech therapy, DVT proph w/SCDs





Severe sepsis due to Cholangitis with VR Enterococcus bacteremia


Choledocolithiases


Lactic acidosis


HTN


Abn CXR


CAD


Abn LFTs due to #1





PLAN:


s/p Lap katya


Add Zyvox


Consult ID


Surg input appreciated


AM labs


Cont Zosyn


Repeat CXR - PA/Lat in AM


Cont other meds as above

## 2020-01-13 NOTE — CON
DATE OF CONSULTATION:  01/13/2020



REQUESTING PHYSICIAN:  Momo Sanchez MD



HISTORY OF PRESENT ILLNESS:  An 82-year-old  man, who is postop day

#1, status post ERCP with extraction of common bile duct stone. 



The patient was previously admitted on 12/31/2019 with acute abdominal pain and was

found with acute cholecystitis with cholelithiasis and choledocholithiasis. 



ERCP was attempted at this time without success on the patient on 01/01/2020. 



The patient was subsequently discharged home on oral antibiotics to follow up with

Gastroenterology on an outpatient basis. 



He presented yesterday with recurrent abdominal pain at this time associated with

worsening weakness and rigors. 



ERCP was then accomplished yesterday with a successful extraction of common bile

duct stone. 



I was asked to evaluate the patient this morning for possible cholecystectomy. 



At the time of my evaluation, he is awake and alert. 



Family is at bedside. 



He reports minimal residual abdominal pain at this time. 



He denies any fevers or chills.



PAST MEDICAL HISTORY:  Pertinent for coronary artery disease, essential

hypertension, hyperlipidemia, and cervical spinal stenosis. 



PAST SURGICAL HISTORY:  Pertinent for recent ERCP x2, coronary artery bypass graft

15 years ago, and cervical laminectomy on 12/14/2019. 



SOCIAL HISTORY:  He is , lives at home with his wife. 



He is actually quite functional and active for his age. 



He denies any cigarette smoking, ethanol, or illicit drug abuse.



FAMILY HISTORY:  Noncontributory for this patient's age.



ALLERGIES:  THE PATIENT HAS NO KNOWN DRUG ALLERGIES.



MEDICATIONS:  I have reviewed all his prehospitalization medication, which includes; 

1. Pravastatin 40 mg p.o. daily.

2. Aspirin 81 mg p.o. daily.

3. Acetaminophen 650 mg p.o. q.4 hours p.r.n.

4. Multiple vitamins.

5. Proscar 5 mg p.o. daily.

6. Advair Diskus 100/50 b.i.d.

7. Lisinopril 10 mg p.o. b.i.d.

8. Omega-3 fatty acids 1000 mg p.o. daily.

9. Flomax 0.4 mg p.o. daily.

10. Travatan eye drop to right eye q.p.m.



REVIEW OF SYSTEMS:  Ten-point review of systems essentially unremarkable except as

stated in past medical history and chief complaint. 



PHYSICAL EXAMINATION:

GENERAL:  This reveals an 82-year-old normally developed man, who is otherwise

coherent and interactive and appears stated age.  The patient is alert and oriented

x3, appears to be in no acute distress at the time of my evaluation. 

VITAL SIGNS:  Currently include blood pressure 128/53, pulse is 54, respiratory rate

is 20, temperature 98.1 degrees Fahrenheit, and oxygen saturation is 98% on room

air. 

HEENT:  Reveals normocephalic and atraumatic.  Pupils are equal, round, and reactive

to light and accommodation.  He has no scleral icterus present. 

HEART:  Reveals regular rate with mild sinus bradycardia.  No murmurs or gallops

auscultated. 

LUNGS:  Clear to auscultation bilaterally.  Breathing, regular and unlabored. 

ABDOMEN:  Soft, nontender, and nondistended.  Liver and spleen nonpalpable below

costal margin. 

NEUROLOGIC:  Reveals no focal deficits present.



LABORATORY FINDINGS:  Today include CBC with 9100 white blood cells, hemoglobin and

hematocrit are 9.3 and 30.0 respectively, and platelet count is 139,000. 



Metabolic profile; sodium 137, potassium 3.9, chloride is 109, bicarb is 22, BUN is

13, creatinine is 0.95, glucose 123, total bilirubin is 0.5, AST and ALT are 140 and

141 respectively, this is down from 353 and 219 yesterday. 



Alkaline phosphatase is also 216, down from 279 yesterday. 



Total bilirubin is 0.5, this is down from 1.4 yesterday. 



I have personally reviewed the abdominal ultrasound obtained yesterday, which is

remarkable for intraluminal gallstones, gallbladder wall thickening at 5 mm and

dilated common bile duct at 14.1 mm in diameter. 



IMPRESSION:  

1. Acute cholecystitis with cholelithiasis.

2. Status post endoscopic retrograde cholangiopancreatography with common bile duct

stone extraction. 



PLAN:  Laparoscopic cholecystectomy. 



Above findings and plan has been discussed with the patient in the presence of his

family. 



I have advised him of the risks and benefits of the proposed surgery to include, but

not limited to bleeding, infection, injury to bile duct or surrounding structures. 



The patient indicates understanding information given. 



I have answered his questions. 



The patient has granted consent for the surgical intervention. 



Thank you again, Dr. Sanchez, for allowing me the opportunity to participate in the

care of this patient. 







Job ID:  [id number]

## 2020-01-13 NOTE — OP
DATE OF PROCEDURE:  01/13/2020



PREOPERATIVE DIAGNOSES:  

1. Acute cholecystitis and cholelithiasis.

2. Choledocholithiasis status post endoscopic retrograde cholangiopancreatography

with common bile duct stone extraction. 



POSTOPERATIVE DIAGNOSES:  

1. Acute cholecystitis and cholelithiasis.

2. Choledocholithiasis status post endoscopic retrograde cholangiopancreatography

with common bile duct stone extraction. 



OPERATION PERFORMED:  Laparoscopic cholecystectomy.



ANESTHESIA:  General endotracheal.



ESTIMATED BLOOD LOSS:  20 mL.



FLUIDS GIVEN:  100 mL of crystalloids.



COUNTS:  Sponge and instrument counts were verified as correct x2.



COMPLICATIONS:  None apparent at the time of operation.



INDICATIONS FOR OPERATION:  An 82-year-old man presented with recurrent epigastric

and right upper quadrant abdominal pain. 



Clinical radiographic examination was consistent with acute cholecystitis,

cholelithiasis, and choledocholithiasis, for which the patient underwent an ERCP

with common bile duct stone extraction yesterday. 



He was brought to the operating room today for cholecystectomy. 



Findings are consistent with contracted gallbladder in the usual anatomic location,

partially encased by omental adhesions. 



DESCRIPTION OF OPERATION:  Informed consent was obtained from the patient, brought

to the operating room and placed in supine position. 



Following general anesthesia, abdomen was sterilely prepped and draped in usual

fashion. 



The skin below the umbilicus was infiltrated with 0.25% Marcaine with epinephrine. 



A small curvilinear infraumbilical incision was made using 11 scalpel. 



Umbilical stalk grasped with Kocher and elevated. 



Veress needle was inserted through the incision and placed in the peritoneal cavity,

through which the abdomen was insufflated with 3 L of CO2 gas. 



Intraabdominal pressure noted at 1 mmHg. 



Following abdominal insufflation, Veress needle was removed and a 5 mm trocar

introduced using a Visiport under laparoscopy. 



Laparoscopy confirmed proper placement of the port.  No injuries to underlying

structures. 



Additional laparoscopy revealed the gallbladder in the usual anatomic location,

partially encased by omental adhesions. 



Under direct laparoscopy, a 12-mm epigastric and two 5-mm right lateral subcostal

ports were placed after the overlying skin infiltrated with 0.25% Marcaine with

epinephrine and appropriate incision was made. 



The patient was placed in a reverse Trendelenburg position, rotated to his left. 



I introduced Prestige grasper through the right lateral subcostal port, grasping the

fundus of the gallbladder, which was elevated cephalad. 



Using [place] dissector with cautery, omental adhesions were taken down off the

remainder of the gallbladder. 



A second Prestige grasper introduced through the right medial subcostal port,

grasping the Enrico's pouch, which was retracted laterally. 



The cystic duct was carefully dissected from surrounding structures and divided

between clips.  Two clips applied proximally, one clip at the junction of the cystic

duct and gallbladder. 



The cystic artery was also carefully dissected free from surrounding structures and

divided between clips in a similar fashion. 



The thick-walled gallbladder itself was removed from the liver bed using cautery

with good hemostasis. 



The gallbladder was delivered off the abdominal cavity using EndoCatch. 



Operative site was inspected for good hemostasis. 



Finding no other pathology.  Laparoscopy was terminated. 



Fascia of the epigastric port was closed using 0 Vicryl suture and Endoclosure

device on the laparoscopy. 



The abdomen was desufflated. 



All ports and instruments removed and accounted for. 



Skin incisions were closed using 4-0 Monocryl suture in subcuticular fashion. 



Dermabond was applied over incisional closure. 



The patient tolerated this operation without any apparent complication and was

returned to recovery room in satisfactory condition. 







Job ID:  [id number]

## 2020-01-14 NOTE — CON
DATE OF CONSULTATION:  01/14/2020



REASON FOR CONSULTATION:  Bacteremia.



HISTORY OF PRESENT ILLNESS:  An 82-year-old patient, history of hypertension,

coronary artery disease with bypass graft surgery, and a large subdural hematoma in

March 2017, status post erik hole drainage.  In December of this year, he sustained

a fall and had to have a decompressive surgery in the C-spine, and subsequently, he

was brought by family members because of weakness.  According to the patient, he

actually himself did not want to come to the hospital and he was forced by family

members.  He denied any specific symptoms sort of weakness.  His neck area was

healing properly and there was no pain and he had good strength in lower

extremities.  He denied any headaches.  No shortness of breath, cough, or sputum

production.  No abdominal pain, although some family members report that he did

complain of some unusual sensation in the abdomen, but not pain per se.  Certainly

he did not have any genitourinary symptoms.  No diarrhea.  Actually, he has some

constipation and no neurological symptoms. 



MEDICAL HISTORY:  Hypertension, coronary artery disease, myocardial infarction,

reportedly with some prostate enlargement, bypass graft surgery, decompressive

surgery in the neck with fusion. 



SOCIAL HISTORY:  He did smoke for 15 years, but quit.  He lives in [place] and he is

.  Never used alcoholic beverages.  No drug use. 



ALLERGIES:  NONE.



FAMILY HISTORY:  Noncontributory.



CURRENT MEDICATIONS:  

1. Codeine.

2. Floranex.

3. Ecotrin.

4. Caltrate.

5. Proscar.

6. Fish oil.

7. Xalatan.

8. Zyvox.

9. Zosyn.



PHYSICAL EXAMINATION:

VITAL SIGNS:  T-max 100.9 on January 12th, and now blood pressure 149/70, pulse 62. 

SKIN:  Normal peripheral IV access.  The cervical spine surgery is healed

completely.  The patient has the ports for the cholecystectomy.  No lymphadenopathy. 

HEENT:  Ocular movements are conjugate.  Oral cavity is not remarkable. 

NECK:  Supple.  No jugular vein distention or carotid bruits. 

LUNGS:  Clear to auscultation and percussion. 

HEART:  S1 and S2, regular rate. 

ABDOMEN:  Soft.  Not distended or tender.  No ascites.  No bladder distention.  No

organomegaly. 

MUSCULOSKELETAL:  No joint inflammatory activity.  Moves extremities equally. 

NEUROLOGIC:  Cognitive function appears to be intact.



LABORATORY STUDIES:  Urinalysis, 0 to 3 wbc's.  WBC count is down from 12.4 to 7.8,

hemoglobin 9.3, platelets 147.  The AST was 353, down to 80; bilirubin was 1.4, down

to 0.5; , down to 116; and alkaline phosphatase 279, down to 189.  White cell

count is down to 7.8, hemoglobin 9.3, with 82% neutrophils.  Microbiology with VRE

in 2 sets of blood cultures and one set of urine cultures.  The blood cultures were

obtained, both collected more or less at same time as per records from the lab. 



IMAGING:  Include a chest x-ray with no evidence of acute cardiopulmonary disease.

There is an abdomen ultrasound on admission, which showed dilation of common bile

duct, wall thickening of the gallbladder, and the patient had ERCP done on admission

with opacification of dilated common bile duct, filling defects, likely

choledocholithiasis.  The patient had a cholecystectomy on January 13th, the

pathology showed acute on chronic cholecystitis. 



ASSESSMENT:  

1. Coronary artery disease with prior bypass graft surgery.

2. 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

3. Biliary obstruction due to choledocholithiasis and cholelithiasis and

cholecystitis, status post ERCP and removal of the gallbladder, which confirmed

diagnosis. 

4. Bacteremia with vancomycin-resistant Enterococcus.

5. Urine culture positive for vancomycin-resistant Enterococcus.



DISCUSSION:  The VRE in blood associated with the biliary tract disease is not very

common finding, but it has been reported, linezolid is the best choice for this sort

of process and I would probably continue it for another week or so with oral

linezolid and then discontinue antimicrobial therapy.  The urinary tract findings do

not suggest a primary source from the urinary tract rather than passage of the VRE

through the blood stream into the urine, so this is reflection of the bacteremia,

not the other way around. 







Job ID:  [id number]

## 2020-01-14 NOTE — DIS
DATE OF ADMISSION:  01/12/2020



DATE OF DISCHARGE:  01/14/2020



DISCHARGE DISPOSITION:  Home.



FOLLOWUP:  

1. Follow up with primary care physician in 1 week.  Please note that the patient

does not have a primary care physician.  He was advised to follow up with Dr. Mason. 

2. Follow up with Gastroenterology and General Surgery as scheduled.



ALLERGIES:  NO KNOWN DRUG ALLERGIES.



DISCHARGE MEDICATIONS:  

1. Zyvox 600 mg twice daily for next 5 days.

2. All other home medications were left unchanged. 

The patient was seen and examined on the day of discharge.  Please refer to my

progress note for details. 



BRIEF HOSPITAL COURSE:  The patient is an 82-year-old  male with

coronary artery disease, hypertension with recent hospitalization for cervical spine

stenosis requiring surgery, presented to the emergency room with abdominal pain,

generalized weakness, along with fever and chills.  His workup was consistent with

sepsis secondary to cholangitis along with vancomycin-resistant Enterococcus.  He

was evaluated by Gastroenterology as well as General Surgery.  He underwent ERCP on

12th January 2020, that showed choledocholithiasis with cholangitis.  Next day, he

underwent laparoscopic cholecystectomy by Dr. Petit.  He was evaluated by Infectious

Disease today, who recommended Zyvox 600 mg twice daily for next 5 days.  He has

been cleared by consultants for discharge. 



FINAL DIAGNOSES:  

1. Severe sepsis secondary to cholangitis with vancomycin-resistant Enterococcus

bacteremia. 

2. Choledocholithiasis, status post laparoscopic cholecystectomy in this admission.

3. Lactic acidosis on admission.

4. Hypertension.

5. Coronary artery disease.

6. Abnormal LFTs secondary to #1. 

Plan of care was discussed with the patient in detail.  He stated understanding. 



Total time coordinating the discharge of this patient was 36 minutes.







Job ID:  [id number]

## 2020-01-14 NOTE — PRG
DATE OF SERVICE:  01/14/2020



SUBJECTIVE:  Mr. Acevedo is an 82-year-old man, who is postoperative day #1, status

post laparoscopic cholecystectomy. 



The patient is awake and alert today, reports no abdominal pain. 



He is tolerating general diet. 



He has remained hemodynamically stable and afebrile since yesterday.



OBJECTIVE:  VITAL SIGNS:  Current vital signs include blood pressure 149/70, pulse

62, respiratory rate is 18, temperature 98.2 degrees Fahrenheit, oxygen saturation

100% on room air. 

HEART:  Reveals regular rate and rhythm. 

LUNGS:  Clear to auscultation bilaterally.  Breathing, regular and nonlabored. 

ABDOMEN:  Soft, nontender, and nondistended.  All incisions remained intact, clean,

dry. 



LABORATORY FINDINGS:  Today include a CBC with 7800 white blood cells, hemoglobin

and hematocrit are 9.3 and 29.9 respectively. 



Platelet count is 147,000. 



Metabolic profile; sodium 138, potassium is 4.0, chloride is 110, bicarb is 22, BUN

is 9, creatinine 0.86, glucose 105.  Total bilirubin is 0.5.  AST and ALT continue

to normalize, now at 80 and 116 respectively, these were down from 140 and 141

respectively yesterday. 



Alkaline phosphatase is also normalizing at 189, down from 216 yesterday. 



Microbiology included blood culture obtained on 01/11, which is significant for

vancomycin-resistant Enterococcus. 



IMPRESSION:  Postoperative day #1, status post laparoscopic cholecystectomy.



PLAN:  Increase activity per Physical and Occupational Therapy. 



The patient is certainly hemodynamically stable from surgical standpoint and may be

discharged home at the discretion of Primary Service. 



Antibiotic regimen has been managed by Infectious Disease.  The patient will see me

in the Surgery Clinic on __________ at 1430 hours. 







Job ID:  [id number]

## 2020-01-14 NOTE — PRG
DATE OF SERVICE:  01/14/2020



SUBJECTIVE:  Mr. Acevedo underwent uncomplicated laparoscopic cholecystectomy

yesterday.  He is feeling very well today.  He denies any abdominal pain or nausea.

He has been tolerating his diet.  He has been afebrile, continued on antibiotics for

what appears to be VRE bacteremia. 



OBJECTIVE:  VITAL SIGNS:  Temperature 98.2, blood pressure 149/70, pulse 62, and

100% oxygen saturation on room air. 

GENERAL:  No acute distress. 

HEART:  Regular rate and rhythm. 

LUNGS:  Clear to auscultation bilaterally. 

ABDOMEN:  Bowel sounds are present.  Soft and nontender to palpation.  Surgical

sites look good. 

EXTREMITIES:  No peripheral edema.



LABORATORY STUDIES:  WBC 7.8, hemoglobin 9.3, platelets 147.  Sodium 138, potassium

4.0, BUN 9, creatinine 0.86.  Total bilirubin is down to 0.5, alkaline phosphatase

down to 189, AST down to 80, and ALT down to 116. 



ASSESSMENT AND PLAN:  Choledocholithiasis with cholecystitis.  The patient is now

status post endoscopic retrograde cholangiopancreatography with stone extraction as

well as cholecystectomy, feeling well, LFTs downtrending.  At his prior

presentation, we had a plan for referral for ERCP at a tertiary center, but I do not

think that is going to be necessary given the good result that he has had this

admission.  No barriers to hospital discharge from a GI perspective. 



GI will sign off, but please call back anytime with questions or concerns.







Job ID:  [id number]

## 2020-01-14 NOTE — PDOC.HOSPP
- Subjective


Encounter Date: 01/14/20


Encounter Time: 13:37


Subjective: 





Patient seen and examined for sepsis. No N/V. Asking when he can go home. No 

new complaints. No overnight events





- Objective


Vital Signs & Weight: 


 Vital Signs (12 hours)











  Temp Pulse Resp BP BP Pulse Ox


 


 01/14/20 09:38     149/70 H  


 


 01/14/20 08:00       100


 


 01/14/20 07:35  98.2 F  62  18   149/70 H  100


 


 01/14/20 04:36  98.2 F  53 L  14   136/68  95








 Weight











Weight                         176 lb 5.917 oz














I&O: 


 











 01/13/20 01/14/20 01/15/20





 06:59 06:59 06:59


 


Intake Total 1550 1450 


 


Output Total 750  


 


Balance 800 1450 











Result Diagrams: 


 01/14/20 05:28





 01/14/20 05:28


Radiology Reviewed by me: Yes (CXR - no pneumonia)





Hospitalist ROS





- Review of Systems


Respiratory: denies: cough, dry, shortness of breath, hemoptysis, SOB with 

excertion, pleuritic pain, sputum, wheezing, other


Cardiovascular: denies: chest pain, palpitations, orthopnea, paroxysmal noc. 

dyspnea, edema, light headedness, other





- Medication


Medications: 


Active Medications











Generic Name Dose Route Start Last Admin





  Trade Name Godwinq  PRN Reason Stop Dose Admin


 


Acetaminophen  650 mg  01/13/20 18:00  01/14/20 11:59





  Tylenol  PO   Not Given





  Q6HR ZUHAIR   





     





     





     





     


 


Acidophilus  1 tab  01/12/20 15:00  01/14/20 09:37





  Floranex  PO   1 tab





  TID ZUHAIR   Administration





     





     





     





     


 


Aspirin  81 mg  01/13/20 09:00  01/14/20 09:37





  Ecotrin  PO   81 mg





  DAILY ZHUAIR   Administration





     





     





     





     


 


Calcium/Vitamin D  1 tab  01/12/20 09:00  01/14/20 09:38





  Caltrate 600 + Vit D  PO   1 tab





  DAILY ZUHAIR   Administration





     





     





     





     


 


Finasteride  5 mg  01/13/20 09:00  01/14/20 09:37





  Proscar  PO   5 mg





  DAILY ZUHAIR   Administration





     





     





     





     


 


Fish Oil  1,000 mg  01/13/20 09:00  01/14/20 09:38





  Fish Oil  PO   1,000 mg





  DAILY ZUHAIR   Administration





     





     





     





     


 


Piperacillin Sod/Tazobactam  100 mls @ 200 mls/hr  01/12/20 04:00  01/14/20 09:

38





  Sod 3.375 gm/ Sodium Chloride  IVPB   100 mls





  0400,1000,1600,2200 ZUHAIR   Administration





     





     





     





     


 


Linezolid 600 mg/ Device  300 mls @ 150 mls/hr  01/13/20 12:00  01/14/20 11:58





  IVPB   300 mls





  1200,2359 ZUHAIR   Administration





     





     





     





     


 


Latanoprost  1 drop  01/12/20 21:00  01/13/20 20:40





  Xalatan 0.005% Ophth Soln  R EYE   1 drop





  HS ZUHAIR   Administration





     





     





     





     


 


Lisinopril  10 mg  01/12/20 21:00  01/14/20 09:38





  Zestril  PO   Not Given





  BID ZUHAIR   





     





     





     





     


 


Mometasone Furoate/Formoterol Fumar  2 puff  01/12/20 18:30  01/13/20 07:27





  Dulera 100 Mcg/5 Mcg Inhaler  INH   2 puff





  BID-RT PRN   Administration





  SEASONAL DYSPNEA   





     





     





     


 


Multivitamins  1 tab  01/12/20 09:00  01/14/20 09:37





  Theragran  PO   1 tab





  DAILY ZUHAIR   Administration





     





     





     





     


 


Pantoprazole Sodium  40 mg  01/12/20 09:00  01/14/20 09:37





  Protonix  PO   40 mg





  DAILY ZUHAIR   Administration





     





     





     





     


 


Sodium Chloride  10 ml  01/12/20 09:00  01/14/20 09:38





  Flush - Normal Saline  IVF   10 ml





  Q12HR ZUHAIR   Administration





     





     





     





     


 


Tamsulosin HCl  0.4 mg  01/12/20 09:00  01/14/20 09:37





  Flomax  PO   0.4 mg





  DAILY ZUHAIR   Administration





     





     





     





     














- Exam


General Appearance: NAD


Heart: RRR, no gallops, no rubs


Respiratory: no wheezes, no rales


Gastrointestinal: soft, non-distended, normal bowel sounds


Extremities: no cyanosis





Hosp A/P





- Plan


DVT proph w/SCDs





Severe sepsis due to Cholangitis with VR Enterococcus bacteremia


Choledocolithiases s/p Lap Lillian


Lactic acidosis


HTN


Abn CXR - Repeat CXR - negative (Pneumonia ruled out)


CAD


Abn LFTs - improving





PLAN:


Cont Zyvox/Zosyn


Pneumonia ruled out


Await ID input


Cont other meds as above


[facility] eval

## 2020-01-14 NOTE — RAD
EXAM:

Chest 2 views:



HISTORY:

Pneumonia



COMPARISON:

1/11/2020



FINDINGS:

There is a normal-sized cardiomediastinal silhouette.  Atherosclerotic calcifications are seen in the
 aorta. The patient is status post sternotomy.  There is no evidence of consolidation, mass, or

pleural effusion.   Postsurgical changes are seen at the cervicothoracic junction. There appears to b
e free air beneath the diaphragm.



IMPRESSION:



1. No evidence of acute cardiopulmonary disease

2. Likely free air beneath the diaphragm. This is likely from recent cholecystectomy.



Reported By: Sami Tena 

Electronically Signed:  1/14/2020 11:07 AM